# Patient Record
Sex: MALE | Race: WHITE | NOT HISPANIC OR LATINO | Employment: FULL TIME | URBAN - METROPOLITAN AREA
[De-identification: names, ages, dates, MRNs, and addresses within clinical notes are randomized per-mention and may not be internally consistent; named-entity substitution may affect disease eponyms.]

---

## 2017-01-02 ENCOUNTER — OFFICE VISIT (OUTPATIENT)
Dept: FAMILY MEDICINE | Facility: OTHER | Age: 52
End: 2017-01-02
Payer: COMMERCIAL

## 2017-01-02 VITALS
DIASTOLIC BLOOD PRESSURE: 70 MMHG | RESPIRATION RATE: 12 BRPM | SYSTOLIC BLOOD PRESSURE: 110 MMHG | WEIGHT: 192.4 LBS | HEIGHT: 70 IN | TEMPERATURE: 97.7 F | BODY MASS INDEX: 27.54 KG/M2 | HEART RATE: 76 BPM

## 2017-01-02 DIAGNOSIS — J01.01 ACUTE RECURRENT MAXILLARY SINUSITIS: Primary | ICD-10-CM

## 2017-01-02 DIAGNOSIS — R09.82 POSTNASAL DRIP: ICD-10-CM

## 2017-01-02 PROCEDURE — 99213 OFFICE O/P EST LOW 20 MIN: CPT | Performed by: PHYSICIAN ASSISTANT

## 2017-01-02 RX ORDER — CEFDINIR 300 MG/1
300 CAPSULE ORAL 2 TIMES DAILY
Qty: 20 CAPSULE | Refills: 0 | Status: SHIPPED | OUTPATIENT
Start: 2017-01-02 | End: 2017-02-10

## 2017-01-02 RX ORDER — FLUTICASONE PROPIONATE 50 MCG
1-2 SPRAY, SUSPENSION (ML) NASAL DAILY
Qty: 1 BOTTLE | Refills: 5 | Status: SHIPPED | OUTPATIENT
Start: 2017-01-02 | End: 2021-01-11

## 2017-01-02 NOTE — PROGRESS NOTES
SUBJECTIVE:                                                    Trev Soto is a 51 year old male who presents to clinic today for the following health issues:    HPI    Acute Illness   Acute illness concerns: cough  Onset: 2 months     Fever: no    Chills/Sweats: no    Headache (location?): no    Sinus Pressure:YES    Conjunctivitis:  no    Ear Pain: YES: left    Rhinorrhea: YES    Congestion: YES    Sore Throat: YES     Cough: YES-non-productive    Wheeze: no    Decreased Appetite: no    Nausea: no    Vomiting: no    Diarrhea:  no    Dysuria/Freq.: no    Fatigue/Achiness: no    Sick/Strep Exposure: YES- 2 months ago     Therapies Tried and outcome: none    He got sick during Thanksgiving with a dry cough that has not gone away. He has also been experiencing sinus pressure/congestion for the past few months as well daily nasal drainage and postnasal drip. He has taken OTC medications such as Nyquil/Dayquil which have helped him sleep. He has not tried any nasal sprays recently. He wants to make sure the post-nasal drip is not allergy related. He does have a history of seasonal allergies years ago. He also wants to be checked for food allergies as he has a family history and will experience intermittent abdominal pain and diarrhea.     Problem list and histories reviewed & adjusted, as indicated.  Additional history: none    Problem list, Medication list, Allergies, and Medical/Social/Surgical histories reviewed in [facility] and updated as appropriate.    ROS:  GENERAL: Denies fever, fatigue, weakness, weight gain, or weight loss.  HEENT: Eyes-Denies pain, redness, loss of vision, double or blurred vision.     Ears/Nose- +Nasal/sinus congestion. Denies tinnitus, loss of hearing, epistaxis, decreased sense of smell. Denies loss of sense of taste, dry mouth, or sore throat.   CARDIOVASCULAR: Denies chest pain, shortness of breath, irregular heartbeats,  palpitations, or edema.  RESPIRATORY: Denies cough,  "hemoptysis, and shortness of breath.  NEUROLOGIC: Denies headache, fainting, dizziness, memory loss, numbness, tingling, or seizures.    OBJECTIVE:                                                    /70 mmHg  Pulse 76  Temp(Src) 97.7  F (36.5  C) (Temporal)  Resp 12  Ht 5' 10\" (1.778 m)  Wt 192 lb 6.4 oz (87.272 kg)  BMI 27.61 kg/m2  Body mass index is 27.61 kg/(m^2).  GENERAL: healthy, alert and no distress  EYES: Eyes grossly normal to inspection, PERRL and conjunctivae and sclerae normal  HENT: ear canals and TM's normal. Nasal mucosa is diffusely erythematous and edematous. Pharynx is clear.   NECK: no adenopathy, no asymmetry, masses, or scars and thyroid normal to palpation  RESP: lungs clear to auscultation - no rales, rhonchi or wheezes  CV: regular rate and rhythm, normal S1 S2, no S3 or S4, no murmur, click or rub       ASSESSMENT/PLAN:                                                        ICD-10-CM    1. Acute recurrent maxillary sinusitis J01.01 ALLERGY/ASTHMA ADULT REFERRAL     cefdinir (OMNICEF) 300 MG capsule     fluticasone (FLONASE) 50 MCG/ACT spray   2. Postnasal drip R09.82 ALLERGY/ASTHMA ADULT REFERRAL     cefdinir (OMNICEF) 300 MG capsule     fluticasone (FLONASE) 50 MCG/ACT spray       Symptoms consistent with sinusitis, this almost qualifies as chronic sinusitis but has not been quite 3 months. Will prescribe Omnicef to use twice daily for 10 days. Take a daily probiotic or Activia yogurt while on this.  I recommend he start Flonase to help with the sinus inflammation/congestion.  Use 1-2 sprays in each nostril daily.  Continue with Mucinex.  Drink plenty of fluids.  Will send to an allergist for further evaluation of continued PND and sinus pressure as this may be allergy related. Will also send to evaluation of possible food allergies due to intermittent indigestion and diarrhea.     Manuel Jarquin PA-C  [facility]"

## 2017-01-02 NOTE — NURSING NOTE
"Chief Complaint   Patient presents with     Cough       Initial /70 mmHg  Pulse 76  Temp(Src) 97.7  F (36.5  C) (Temporal)  Resp 12  Ht 5' 10\" (1.778 m)  Wt 192 lb 6.4 oz (87.272 kg)  BMI 27.61 kg/m2 Estimated body mass index is 27.61 kg/(m^2) as calculated from the following:    Height as of this encounter: 5' 10\" (1.778 m).    Weight as of this encounter: 192 lb 6.4 oz (87.272 kg).  BP completed using cuff size: regular-long  Denita Yadav CMA    "

## 2017-01-02 NOTE — MR AVS SNAPSHOT
After Visit Summary   1/2/2017    Trev Soto    MRN: [id number]           Patient Information     Date Of Birth          1965        Visit Information        Provider Department      1/2/2017 12:30 PM Manuel Jarquin PA-C [facility]        Today's Diagnoses     Acute recurrent maxillary sinusitis    -  1     Postnasal drip           Care Instructions    Will prescribe an antibiotic to use twice daily for 10 days to clear up the sinus infection. Take a daily probiotic or Activia yogurt while on this.  I also recommend you start Flonase to help with the sinus inflammation.  Use 1-2 sprays in each nostril daily.  You can try Mucinex as well.  Make sure you are drinking plenty of fluids.  Will send to an allergist for further evaluation.           Follow-ups after your visit        Additional Services     ALLERGY/ASTHMA ADULT REFERRAL       Your provider has referred you to: [place]: [facility] [phone] [email or web address]    Postnasal drip and sinus congestion for 2 months, also occasional diarrhea and indigestion, wants to discuss possible food allergies.    Please be aware that coverage of these services is subject to the terms and limitations of your health insurance plan.  Call member services at your health plan with any benefit or coverage questions.      Please bring the following with you to your appointment:    (1) Any X-Rays, CTs or MRIs which have been performed.  Contact the facility where they were done to arrange for  prior to your scheduled appointment.    (2) List of current medications  (3) This referral request   (4) Any documents/labs given to you for this referral                  Who to contact     If you have questions or need follow up information about today's clinic visit or your schedule please contact [facility] directly at [phone].  Normal or non-critical lab and  "imaging results will be communicated to you by MyChart, letter or phone within 4 business days after the clinic has received the results. If you do not hear from us within 7 days, please contact the clinic through [employer] or phone. If you have a critical or abnormal lab result, we will notify you by phone as soon as possible.  Submit refill requests through [employer] or call your pharmacy and they will forward the refill request to us. Please allow 3 business days for your refill to be completed.          Additional Information About Your Visit        [employer] Information     [employer] lets you send messages to your doctor, view your test results, renew your prescriptions, schedule appointments and more. To sign up, go to [email or web address] . Click on \"Log in\" on the left side of the screen, which will take you to the Welcome page. Then click on \"Sign up Now\" on the right side of the page.     You will be asked to enter the access code listed below, as well as some personal information. Please follow the directions to create your username and password.     Your access code is: [email or web address]  Expires: 2017  1:00 PM     Your access code will  in 90 days. If you need help or a new code, please call your [place] clinic or [phone].        Your Vitals Were     Pulse Temperature Respirations Height BMI (Body Mass Index)       76 97.7  F (36.5  C) (Temporal) 12 5' 10\" (1.778 m) 27.61 kg/m2        Blood Pressure from Last 3 Encounters:   17 110/70   16 111/82   16 108/68    Weight from Last 3 Encounters:   17 192 lb 6.4 oz (87.272 kg)   16 193 lb (87.544 kg)   13 191 lb (86.637 kg)              We Performed the Following     ALLERGY/ASTHMA ADULT REFERRAL          Today's Medication Changes          These changes are accurate as of: 17  1:00 PM.  If you have any questions, ask your nurse or doctor.               Start taking these medicines.        Dose/Directions    " cefdinir 300 MG capsule   Commonly known as:  OMNICEF   Used for:  Acute recurrent maxillary sinusitis, Postnasal drip   Started by:  Manuel Jarquin PA-C        Dose:  300 mg   Take 1 capsule (300 mg) by mouth 2 times daily   Quantity:  20 capsule   Refills:  0       fluticasone 50 MCG/ACT spray   Commonly known as:  FLONASE   Used for:  Postnasal drip, Acute recurrent maxillary sinusitis   Started by:  Manuel Jarquin PA-C        Dose:  1-2 spray   Spray 1-2 sprays into both nostrils daily   Quantity:  1 Bottle   Refills:  5            Where to get your medicines      These medications were sent to [id number] - [place], [place] - [address]  [address] [facility] [address]     Phone:  [phone]    - cefdinir 300 MG capsule  - fluticasone 50 MCG/ACT spray             Primary Care Provider Office Phone # Fax #    Lukas Galo Shea [phone]       [facility]  [address] [place] [address]        Thank you!     Thank you for choosing [facility]  for your care. Our goal is always to provide you with excellent care. Hearing back from our patients is one way we can continue to improve our services. Please take a few minutes to complete the written survey that you may receive in the mail after your visit with us. Thank you!             Your Updated Medication List - Protect others around you: Learn how to safely use, store and throw away your medicines at www.disposemymeds.org.          This list is accurate as of: 1/2/17  1:00 PM.  Always use your most recent med list.                   Brand Name Dispense Instructions for use    cefdinir 300 MG capsule    OMNICEF    20 capsule    Take 1 capsule (300 mg) by mouth 2 times daily       fluticasone 50 MCG/ACT spray    FLONASE    1 Bottle    Spray 1-2 sprays into both nostrils daily

## 2017-01-02 NOTE — PATIENT INSTRUCTIONS
Will prescribe an antibiotic to use twice daily for 10 days to clear up the sinus infection. Take a daily probiotic or Activia yogurt while on this.  I also recommend you start Flonase to help with the sinus inflammation.  Use 1-2 sprays in each nostril daily.  You can try Mucinex as well.  Make sure you are drinking plenty of fluids.  Will send to an allergist for further evaluation.

## 2017-01-17 ENCOUNTER — OFFICE VISIT (OUTPATIENT)
Dept: ALLERGY | Facility: OTHER | Age: 52
End: 2017-01-17
Payer: COMMERCIAL

## 2017-01-17 VITALS
DIASTOLIC BLOOD PRESSURE: 72 MMHG | OXYGEN SATURATION: 98 % | SYSTOLIC BLOOD PRESSURE: 110 MMHG | HEART RATE: 59 BPM | BODY MASS INDEX: 27.35 KG/M2 | HEIGHT: 70 IN | WEIGHT: 191 LBS

## 2017-01-17 DIAGNOSIS — J30.1 SEASONAL ALLERGIC RHINITIS DUE TO POLLEN: ICD-10-CM

## 2017-01-17 DIAGNOSIS — E73.9 LACTOSE INTOLERANCE IN ADULT: Primary | ICD-10-CM

## 2017-01-17 DIAGNOSIS — J30.81 ALLERGIC RHINITIS DUE TO DOGS: ICD-10-CM

## 2017-01-17 DIAGNOSIS — J30.89 ALLERGIC RHINITIS DUE TO DUST MITE: ICD-10-CM

## 2017-01-17 PROBLEM — J31.0 CHRONIC RHINITIS: Status: ACTIVE | Noted: 2017-01-17

## 2017-01-17 PROCEDURE — 95004 PERQ TESTS W/ALRGNC XTRCS: CPT | Performed by: ALLERGY & IMMUNOLOGY

## 2017-01-17 PROCEDURE — 99243 OFF/OP CNSLTJ NEW/EST LOW 30: CPT | Mod: 25 | Performed by: ALLERGY & IMMUNOLOGY

## 2017-01-17 NOTE — ASSESSMENT & PLAN NOTE
Symptoms of bloating, increased gas and diarrhea after consuming lactose-containing foods. Tolerates cheese, but has problem with ice cream.    - Symptoms suggestive of lactose intolerance as opposed to IgE mediated milk allergy. No other IgE symptoms. Would suggest trial of Lactaid with lactose-containing foods or lactose-free foods or avoidance of milk products.

## 2017-01-17 NOTE — NURSING NOTE
Per provider verbal order, RN placed Adult Environmental scratch testing panels.  Consent was obtained prior to procedure.  Once panels were placed, patient was monitored for 15 minutes in clinic.  RN read test after 15 minutes and provider was notified of results.  Pt tolerated procedure well.  All questions and concerns were addressed at office visit.     Angela Acevedo RN

## 2017-01-17 NOTE — PROGRESS NOTES
Trev Soto is a 51 year old White male with previous medical history significant for chronic post nasal drip. Trev Soto is being seen today for evaluation of seasonal allergies. The patient is being seen in consultation at the request of Alexandru Jarquin PA-C.    Patient reports that they have had allergy symptoms since his 20's. Symptoms were originally just in the spring. He underwent skin testing in his 20's and positive for trees and dust mite. Now over the last few years he has had perennial without seasonal worsening post nasal dripping, congestion, clearing of throat, sinus pressure, and ocular itching. He also complains of hoarse voice. He has had increased sinus pressure since thanksgiving and was recently treated with cefdinir. Symptoms have improved significantly, but chronic post nasal dripping remains. He was prescribed Flonase, but he never used. He has tried Claritin and Sudafed in the past and both were helpful, but not completely. No CT of sinuses. No ENT evaluation. No problems around cats or dogs. He is interested in allergy testing today.  The patient has no history of asthma, eczema, food allergies, medications allergies or hives.     Patient reports that when he consumes milk he will develop abdominal bloating, increased gas and diarrhea. Denies vomiting. Denies hives, angioedema, shortness of breath. Symptoms occur right away and last hours. He can tolerate cheese. If he consumes ice cream without significant symptoms. He has never tried Lactaid. He has never tried lactose-free products.    ENVIRONMENTAL HISTORY: The family lives in a [facility] [place] The home is heated with a forced air. They does have central air conditioning. The patient's bedroom is furnished with carpeting in bedroom.  Pets inside the house include 1 dog(s). There is not history of cockroach or mice infestation. There is/are 0 smokers in the house.  The house does not have a damp basement.      History reviewed. No pertinent past medical history.  Family History   Problem Relation Age of Onset     DIABETES Mother      DIABETES Maternal Grandfather      Prostate Cancer Father 70     CEREBROVASCULAR DISEASE Father      Past Surgical History   Procedure Laterality Date     C appendectomy  2002     C anesth,knee area surgery  2001     Tobin Procedure, left knee       REVIEW OF SYSTEMS:  General: positive  for weight gain. negative for weight loss. negative for changes in sleep.   Ears: negative for fullness. negative for hearing loss. negative for dizziness.   Nose: positive  for snoring negative  for changes in smell. positive  for drainage.   Eyes: negative for eye watering. positive  for eye itching. negative for vision changes. negative for eye redness.  Throat: positive  for hoarseness. positive  for sore throat. negative for trouble swallowing.   Lungs: negative for shortness of breath.negative for wheezing. positive  for sputum production.   Cardiovascular: negative for chest pain. negative for swelling of ankles. negative for fast or irregular heartbeat.   Gastrointestinal: negative for nausea. positive  for heartburn. positive  for acid reflux.   Musculoskeletal: positive  for joint pain. positive  for joint stiffness. negative for joint swelling.   Neurologic: negative for seizures. negative for fainting. negative for weakness.   Psychiatric: negative for changes in mood. negative for anxiety.   Endocrine: negative for cold intolerance. negative for heat intolerance. negative for tremors.   Lymphatic: negative for lower extremity swelling. negative for lymph node swelling.   Hematologic: negative for easy bruising. negative for easy bleeding.  Integumentary: negative for rash. negative for scaling. negative for nail changes.       Current outpatient prescriptions:      cefdinir (OMNICEF) 300 MG capsule, Take 1 capsule (300 mg) by mouth 2 times daily, Disp: 20 capsule, Rfl: 0     fluticasone  (FLONASE) 50 MCG/ACT spray, Spray 1-2 sprays into both nostrils daily, Disp: 1 Bottle, Rfl: 5  Immunization History   Administered Date(s) Administered     TDAP (BOOSTRIX AGES 10-64) 02/05/2016     No Known Allergies      EXAM:   Constitutional:  Appears well-developed and well-nourished. No distress.   HEENT:   Head: Normocephalic.   Right Ear: External ear normal. TM normal  Left Ear: External ear normal. TM normal  Mouth/Throat: No oropharyngeal exudate present.   Cobblestoning of posterior oropharynx.   Boggy nasal tissue and pale.    Eyes: Conjunctivae are non-erythematous   No maxillary or frontal sinus tenderness to palpation.   Cardiovascular: Normal rate, regular rhythm and normal heart sounds. Exam reveals no gallop and no friction rub.   No murmur heard.  Respiratory: Effort normal and breath sounds normal. No respiratory distress. No wheezes. No rales.   Musculoskeletal: Normal range of motion.   Lymphadenopathy:   No cervical adenopathy.   No lower extremity edema.   Neuro: Oriented to person, place, and time.  Skin: Skin is warm and dry. No rash noted.   Psychiatric: Normal mood and affect.     Nursing note and vitals reviewed.      WORKUP:   Skin testing  Positive for dog, dust mite, trees, grasses and weeds.  ASSESSMENT/PLAN:  Problem List Items Addressed This Visit        Respiratory    Seasonal allergic rhinitis due to pollen     Perennial postnasal drip, congestion, sinus pressure, nasal itching. Associated with throat clearing and hoarseness of voice. Allergy testing and his 20s positive for trees and dust mite. Has tried loratadine and pseudoephedrine both of which have been helpful. No use of nasal corticosteroid. No CT scan of the sinuses. No ENT evaluation.    Allergy testing:  Positive for dog, dust mites, trees, weeds and grasses.    - Flonase, Nasacort or Rhinocort 2 sprays/nostril daily.   - Environmental avoidance measure were provided and discussed with the patient.   - Zyrtec or  Allegra daily as needed.   - Ketotifen (Zaditor, Alaway) 1 drop/eye twice daily as needed.   - Return to clinic in 3 months.   - He would be immunotherapy candidate.              Allergic rhinitis due to dust mite     .         Allergic rhinitis due to dogs     .            Digestive    Lactose intolerance in adult - Primary     Symptoms of bloating, increased gas and diarrhea after consuming lactose-containing foods. Tolerates cheese, but has problem with ice cream.    - Symptoms suggestive of lactose intolerance as opposed to IgE mediated milk allergy. No other IgE symptoms. Would suggest trial of Lactaid with lactose-containing foods or lactose-free foods or avoidance of milk products.               Chart documentation with Dragon Voice recognition Software. Although reviewed after completion, some words and grammatical errors may remain.    Justin Bone,    Allergy/Immunology  [facility], [place] and [place], [place]

## 2017-01-17 NOTE — NURSING NOTE
"Chief Complaint   Patient presents with     Consult     post nasal drip       Initial /72 mmHg  Pulse 59  Ht 5' 10.24\" (1.784 m)  Wt 191 lb (86.637 kg)  BMI 27.22 kg/m2  SpO2 98% Estimated body mass index is 27.22 kg/(m^2) as calculated from the following:    Height as of this encounter: 5' 10.24\" (1.784 m).    Weight as of this encounter: 191 lb (86.637 kg).  BP completed using cuff size: virginia Osborne MA      "

## 2017-01-17 NOTE — PATIENT INSTRUCTIONS
If you have any questions regarding your allergies, asthma, or what we discussed during your visit today please call the allergy clinic or contact us via [employer].    [facility] ([place], [place]): [phone]  [facility]: [phone]  [facility]: [phone]  [facility]: [phone]  [facility]: [phone]  [facility]: [phone]      - Flonase, Nasacort or Rhinocort 2 sprays/nostril daily.   - Zyrtec or Allegra daily as needed.   - Ketotifen (Zaditor, Alaway) 1 drop/eye twice daily as needed.   - Symptoms suggestive of lactose intolerance as opposed to milk allergy. Would suggest trial of Lactaid with lactose-containing foods or lactose-free foods or avoidance of milk products.    Testing positive for dog, dust mites, trees, grasses and weeds.     AEROALLERGEN AVOIDANCE INSTRUCTIONS  POLLEN  Pollens are the tiny airborne particles given off by trees, weeds, and grasses. They can be the cause of seasonal allergic rhinitis or hay fever symptoms, which include stuffy, itchy, runny nose, redness, swelling and itching of the eyes, and itching of the ears and throat. Here are some tips on how to avoid pollen exposure.  1. .Keep windows closed and use the air conditioner when possible.  2.  Avoid outside exposure in the early morning as pollen counts are highest at that time.  3.  Take a shower and wash hair each night.  4.  Consider wearing a mask when working in the yard and/or garden.  5.  Clean furnace filter monthly with HEPA filters. Consider a HEPA filter vacuum  which will prevent pollen from being reintroduced into the air.   DUST MITES  Dust mites can never be entirely eliminated in the house no matter how clean your house is. Dust mites are attracted to warm, moist areas and feed on dead skin flakes. Here are tips to minimize dust mites in your home.  1.  Encase pillows and mattress/box springs in zippered allergy  covers.  2.  Wash bedding in hot water (at least 130 F) every 7-14 days.  3.  Avoid curtains, carpet, and upholstered furniture if possible.  4.  Use HEPA air filters and a HEPA filter vacuum . Change filters monthly. Vacuum weekly.  5.  Keep bedroom simple, avoiding clutter, so it can quickly be dusted.  6.  Cover heating vents with vent filters.  7.  Keep stuffed toys in a closed container and wash or freeze regularly.  8.  Keep clothing in the closet with the door closed.  PETS  Pets present many problems for people with allergies. Dander from pets is very difficult to remove and also is a food source for dust mites.  1.  If possible, find the pet a new home.  2.  If not possible, keep the pet outdoors. Never allow the pet into the bedroom.  3.  Wash pet weekly in warm water.  4.  Encase mattresses, pillows, and box springs in allergen-proof covers.  5.  Use HEPA air filters and a HEPA filter vacuum . Change filters monthly.

## 2017-01-17 NOTE — ASSESSMENT & PLAN NOTE
Perennial postnasal drip, congestion, sinus pressure, nasal itching. Associated with throat clearing and hoarseness of voice. Allergy testing and his 20s positive for trees and dust mite. Has tried loratadine and pseudoephedrine both of which have been helpful. No use of nasal corticosteroid. No CT scan of the sinuses. No ENT evaluation.    Allergy testing:  Positive for dog, dust mites, trees, weeds and grasses.    - Flonase, Nasacort or Rhinocort 2 sprays/nostril daily.   - Environmental avoidance measure were provided and discussed with the patient.   - Zyrtec or Allegra daily as needed.   - Ketotifen (Zaditor, Alaway) 1 drop/eye twice daily as needed.   - Return to clinic in 3 months.   - He would be immunotherapy candidate.

## 2017-01-17 NOTE — MR AVS SNAPSHOT
After Visit Summary   1/17/2017    Trev Soto    MRN: [id number]           Patient Information     Date Of Birth          1965        Visit Information        Provider Department      1/17/2017 8:00 AM Justin Bone,  [facility]        Today's Diagnoses     Lactose intolerance in adult    -  1     Chronic rhinitis           Care Instructions    If you have any questions regarding your allergies, asthma, or what we discussed during your visit today please call the allergy clinic or contact us via [employer].    [facility] Allergy ([place], [place]): [phone]  [facility] Allergy: [phone]  [facility]: [phone]  [facility] Allergy: [phone]  [facility] Allergy: [phone]  [facility] Allergy: [phone]      - Flonase, Nasacort or Rhinocort 2 sprays/nostril daily.   - Zyrtec or Allegra daily as needed.   - Ketotifen (Zaditor, Alaway) 1 drop/eye twice daily as needed.   - Symptoms suggestive of lactose intolerance as opposed to milk allergy. Would suggest trial of Lactaid with lactose-containing foods or lactose-free foods or avoidance of milk products.    Testing positive for dog, dust mites, trees, grasses and weeds.     AEROALLERGEN AVOIDANCE INSTRUCTIONS  POLLEN  Pollens are the tiny airborne particles given off by trees, weeds, and grasses. They can be the cause of seasonal allergic rhinitis or hay fever symptoms, which include stuffy, itchy, runny nose, redness, swelling and itching of the eyes, and itching of the ears and throat. Here are some tips on how to avoid pollen exposure.  1. .Keep windows closed and use the air conditioner when possible.  2.  Avoid outside exposure in the early morning as pollen counts are highest at that time.  3.  Take a shower and wash hair each night.  4.  Consider wearing a mask when working in the yard and/or garden.  5.  Clean furnace filter monthly with HEPA filters.  Consider a HEPA filter vacuum  which will prevent pollen from being reintroduced into the air.   DUST MITES  Dust mites can never be entirely eliminated in the house no matter how clean your house is. Dust mites are attracted to warm, moist areas and feed on dead skin flakes. Here are tips to minimize dust mites in your home.  1.  Encase pillows and mattress/box springs in zippered allergy covers.  2.  Wash bedding in hot water (at least 130 F) every 7-14 days.  3.  Avoid curtains, carpet, and upholstered furniture if possible.  4.  Use HEPA air filters and a HEPA filter vacuum . Change filters monthly. Vacuum weekly.  5.  Keep bedroom simple, avoiding clutter, so it can quickly be dusted.  6.  Cover heating vents with vent filters.  7.  Keep stuffed toys in a closed container and wash or freeze regularly.  8.  Keep clothing in the closet with the door closed.  PETS  Pets present many problems for people with allergies. Dander from pets is very difficult to remove and also is a food source for dust mites.  1.  If possible, find the pet a new home.  2.  If not possible, keep the pet outdoors. Never allow the pet into the bedroom.  3.  Wash pet weekly in warm water.  4.  Encase mattresses, pillows, and box springs in allergen-proof covers.  5.  Use HEPA air filters and a HEPA filter vacuum . Change filters monthly.            Follow-ups after your visit        Follow-up notes from your care team     Return in about 3 months (around 4/17/2017).      Who to contact     If you have questions or need follow up information about today's clinic visit or your schedule please contact [facility] directly at [phone].  Normal or non-critical lab and imaging results will be communicated to you by MyChart, letter or phone within 4 business days after the clinic has received the results. If you do not hear from us within 7 days, please contact the clinic through MyChart or phone. If you  "have a critical or abnormal lab result, we will notify you by phone as soon as possible.  Submit refill requests through [employer] or call your pharmacy and they will forward the refill request to us. Please allow 3 business days for your refill to be completed.          Additional Information About Your Visit        [employer] Information     [employer] lets you send messages to your doctor, view your test results, renew your prescriptions, schedule appointments and more. To sign up, go to [email or web address] . Click on \"Log in\" on the left side of the screen, which will take you to the Welcome page. Then click on \"Sign up Now\" on the right side of the page.     You will be asked to enter the access code listed below, as well as some personal information. Please follow the directions to create your username and password.     Your access code is: [email or web address]  Expires: 2017  1:00 PM     Your access code will  in 90 days. If you need help or a new code, please call your [place] clinic or [phone].        Care EveryWhere ID     This is your Care EveryWhere ID. This could be used by other organizations to access your [place] medical records  [id number]        Your Vitals Were     Pulse Height BMI (Body Mass Index) Pulse Oximetry          59 5' 10.24\" (1.784 m) 27.22 kg/m2 98%         Blood Pressure from Last 3 Encounters:   17 110/72   17 110/70   16 111/82    Weight from Last 3 Encounters:   17 191 lb (86.637 kg)   17 192 lb 6.4 oz (87.272 kg)   16 193 lb (87.544 kg)              Today, you had the following     No orders found for display       Primary Care Provider Office Phone # Fax #    Lukas Galo Shea [phone]       [facility]  [address] [place] [address]        Thank you!     Thank you for choosing [facility]  for your care. Our goal is always to provide you with excellent care. " Hearing back from our patients is one way we can continue to improve our services. Please take a few minutes to complete the written survey that you may receive in the mail after your visit with us. Thank you!             Your Updated Medication List - Protect others around you: Learn how to safely use, store and throw away your medicines at www.disposemymeds.org.          This list is accurate as of: 1/17/17  9:22 AM.  Always use your most recent med list.                   Brand Name Dispense Instructions for use    cefdinir 300 MG capsule    OMNICEF    20 capsule    Take 1 capsule (300 mg) by mouth 2 times daily       fluticasone 50 MCG/ACT spray    FLONASE    1 Bottle    Spray 1-2 sprays into both nostrils daily

## 2017-01-17 NOTE — Clinical Note
I saw Trev today as a new patient. Skin testing was positive to dog, dm, grass, weeds and trees. Discussed allergy treatment including nasal sprays, oral antihistamine as needed and eye antihistamine as needed. He will return to clinic in 3 months. Possibly will start allergy shots. Please see note for details. Thanks.   Justin Bone

## 2017-02-09 NOTE — PROGRESS NOTES
SUBJECTIVE:                                                    Trev Soto is a 52 year old male who presents to clinic today for the following health issues:    HPI    Acute Illness   Acute illness concerns: flu   Onset: 1.5 weeks     Fever: YES    Chills/Sweats: YES- chills    Headache (location?): YES- behind eyes    Sinus Pressure:YES- post-nasal drainage and facial pain    Conjunctivitis:  no    Ear Pain: YES: both    Rhinorrhea: no    Congestion: YES    Sore Throat: YES-due to coughing     Cough: YES-productive of green sputum    Wheeze: YES    Decreased Appetite: YES    Nausea: YES    Vomiting: no    Diarrhea:  YES    Dysuria/Freq.: no    Fatigue/Achiness: YES    Sick/Strep Exposure: no     Therapies Tried and outcome: otc medications, Flonase      Was treated 1 month ago for similar symptoms that resolved for 2 weeks then returned. He is having a lot of sinus pressure, postnasal drip, and a consistently productive cough. He has felt short of breath with some wheezing. Has had some chills. Flonase is helping with congestion.     Problem list and histories reviewed & adjusted, as indicated.  Additional history: none    Problem list, Medication list, Allergies, and Medical/Social/Surgical histories reviewed in [facility] and updated as appropriate.    ROS:  GENERAL: +Fatigue. Denies weakness, weight gain, or weight loss.  HEENT: Eyes-Denies pain, redness, loss of vision, double or blurred vision.     Ears/Nose- +Sinus congestion/pressure, postnasal drip. Denies tinnitus, loss of hearing, epistaxis, decreased sense of smell. Denies loss of sense of taste, dry mouth, or sore throat.   CARDIOVASCULAR: Denies chest pain, shortness of breath, irregular heartbeats,  palpitations, or edema.  RESPIRATORY: +cough, wheezing, shortness of breath. Denies hemoptysis.    OBJECTIVE:                                                    /70 mmHg  Pulse 68  Temp(Src) 98.4  F (36.9  C) (Temporal)  Resp 12  Ht 5'  "10.25\" (1.784 m)  Wt 194 lb (87.998 kg)  BMI 27.65 kg/m2  SpO2 96%  Body mass index is 27.65 kg/(m^2).  GENERAL: healthy, alert and no distress  EYES: Eyes grossly normal to inspection, PERRL and conjunctivae and sclerae normal  HENT: ear canals and TM's normal, nasal mucosa is erythematous and edematous  NECK: no adenopathy, no asymmetry, masses, or scars and thyroid normal to palpation  RESP: lungs with expiratory rhonchi over right upper and lower lobes, no wheezing or crackles  CV: regular rate and rhythm, normal S1 S2, no S3 or S4, no murmur, click or rub    Diagnostic Test Results:  Influenza A/B: negative     ASSESSMENT/PLAN:                                                        ICD-10-CM    1. Acute bronchitis with symptoms > 10 days J20.9 Influenza A/B antigen     azithromycin (ZITHROMAX) 250 MG tablet     predniSONE (DELTASONE) 20 MG tablet     benzonatate (TESSALON) 100 MG capsule   2. Acute sinusitis with symptoms > 10 days J01.90        Symptoms are consistent with bronchitis and sinusitis  Will prescribe azithromycin to take as directed to cover for bacterial bronchitis and sinusitis.   Will also prescribe prednisone to take once daily for 5 days to help with the sinus congestion and wheezing.  Drink plenty of fluids.  Tessalon pearls to help with cough.   Continue with the sinus rinse to help with nasal congestion. Mucinex can also be helpful.   Continue Flonase to help with nasal swelling.  Follow up if symptoms are not improving    Manuel Jarquin PA-C  [facility]  "

## 2017-02-10 ENCOUNTER — OFFICE VISIT (OUTPATIENT)
Dept: FAMILY MEDICINE | Facility: OTHER | Age: 52
End: 2017-02-10
Payer: COMMERCIAL

## 2017-02-10 VITALS
SYSTOLIC BLOOD PRESSURE: 118 MMHG | OXYGEN SATURATION: 96 % | RESPIRATION RATE: 12 BRPM | BODY MASS INDEX: 27.77 KG/M2 | HEIGHT: 70 IN | WEIGHT: 194 LBS | TEMPERATURE: 98.4 F | HEART RATE: 68 BPM | DIASTOLIC BLOOD PRESSURE: 70 MMHG

## 2017-02-10 DIAGNOSIS — J20.9 ACUTE BRONCHITIS WITH SYMPTOMS > 10 DAYS: Primary | ICD-10-CM

## 2017-02-10 DIAGNOSIS — J01.90 ACUTE SINUSITIS WITH SYMPTOMS > 10 DAYS: ICD-10-CM

## 2017-02-10 LAB
FLUAV+FLUBV AG SPEC QL: NEGATIVE
FLUAV+FLUBV AG SPEC QL: NEGATIVE
SPECIMEN SOURCE: NORMAL

## 2017-02-10 PROCEDURE — 99213 OFFICE O/P EST LOW 20 MIN: CPT | Performed by: PHYSICIAN ASSISTANT

## 2017-02-10 PROCEDURE — 87804 INFLUENZA ASSAY W/OPTIC: CPT | Performed by: PHYSICIAN ASSISTANT

## 2017-02-10 RX ORDER — PREDNISONE 20 MG/1
20 TABLET ORAL DAILY
Qty: 5 TABLET | Refills: 0 | Status: SHIPPED | OUTPATIENT
Start: 2017-02-10 | End: 2021-01-11

## 2017-02-10 RX ORDER — AZITHROMYCIN 250 MG/1
TABLET, FILM COATED ORAL
Qty: 6 TABLET | Refills: 0 | Status: SHIPPED | OUTPATIENT
Start: 2017-02-10 | End: 2021-01-11

## 2017-02-10 RX ORDER — BENZONATATE 100 MG/1
100 CAPSULE ORAL 3 TIMES DAILY PRN
Qty: 42 CAPSULE | Refills: 0 | Status: SHIPPED | OUTPATIENT
Start: 2017-02-10 | End: 2021-01-11

## 2017-02-10 NOTE — MR AVS SNAPSHOT
"              After Visit Summary   2/10/2017    Trev Soto    MRN: [id number]           Patient Information     Date Of Birth          1965        Visit Information        Provider Department      2/10/2017 10:15 AM Manuel Jarquin PA-C [facility]        Today's Diagnoses     Acute bronchitis with symptoms > 10 days    -  1     Acute sinusitis with symptoms > 10 days           Care Instructions    Symptoms are consistent with bronchitis and another sinus infection.  Will prescribe an antibiotic called azithromycin to take for 5 days.   Will also prescribe a steroid to take once daily for 5 days to help with the sinus congestion and wheezing.  Drink plenty of fluids.  Continue with the sinus rinse to help with nasal congestion. Mucinex can also be helpful.   Continue Flonase to help with nasal swelling.  Follow up if symptoms are not improving          Follow-ups after your visit        Who to contact     If you have questions or need follow up information about today's clinic visit or your schedule please contact [facility] directly at [phone].  Normal or non-critical lab and imaging results will be communicated to you by [employer], letter or phone within 4 business days after the clinic has received the results. If you do not hear from us within 7 days, please contact the clinic through [employer] or phone. If you have a critical or abnormal lab result, we will notify you by phone as soon as possible.  Submit refill requests through [employer] or call your pharmacy and they will forward the refill request to us. Please allow 3 business days for your refill to be completed.          Additional Information About Your Visit        TapC[employer]dhart Information     [employer] lets you send messages to your doctor, view your test results, renew your prescriptions, schedule appointments and more. To sign up, go to [email or web address] . Click on \"Log in\" on the left side of the " "screen, which will take you to the Welcome page. Then click on \"Sign up Now\" on the right side of the page.     You will be asked to enter the access code listed below, as well as some personal information. Please follow the directions to create your username and password.     Your access code is: [email or web address]  Expires: 2017  1:00 PM     Your access code will  in 90 days. If you need help or a new code, please call your [facility] or [phone].        Care EveryWhere ID     This is your Care EveryWhere ID. This could be used by other organizations to access your [place] medical records  [id number]        Your Vitals Were     Pulse Temperature Respirations Height BMI (Body Mass Index) Pulse Oximetry    68 98.4  F (36.9  C) (Temporal) 12 5' 10.25\" (1.784 m) 27.65 kg/m2 96%       Blood Pressure from Last 3 Encounters:   02/10/17 118/70   17 110/72   17 110/70    Weight from Last 3 Encounters:   02/10/17 194 lb (87.998 kg)   17 191 lb (86.637 kg)   17 192 lb 6.4 oz (87.272 kg)              We Performed the Following     Influenza A/B antigen          Today's Medication Changes          These changes are accurate as of: 2/10/17 10:37 AM.  If you have any questions, ask your nurse or doctor.               Start taking these medicines.        Dose/Directions    azithromycin 250 MG tablet   Commonly known as:  ZITHROMAX   Used for:  Acute bronchitis with symptoms > 10 days   Started by:  Manuel Jarquin PA-C        Two tablets first day, then one tablet daily for four days.   Quantity:  6 tablet   Refills:  0       predniSONE 20 MG tablet   Commonly known as:  DELTASONE   Used for:  Acute bronchitis with symptoms > 10 days   Started by:  Manuel Jarquin PA-C        Dose:  20 mg   Take 1 tablet (20 mg) by mouth daily   Quantity:  5 tablet   Refills:  0            Where to get your medicines      These medications were sent to [facility] [address] [place] [address] - " [address] [facility] [address]  [address], [facility] [address]     Phone:  [phone]    - azithromycin 250 MG tablet  - predniSONE 20 MG tablet             Primary Care Provider Office Phone # Fax #    Manuel Jarquin PA-C [phone]       [address]  [facility] [address]        Thank you!     Thank you for choosing [facility]  for your care. Our goal is always to provide you with excellent care. Hearing back from our patients is one way we can continue to improve our services. Please take a few minutes to complete the written survey that you may receive in the mail after your visit with us. Thank you!             Your Updated Medication List - Protect others around you: Learn how to safely use, store and throw away your medicines at www.disposemymeds.org.          This list is accurate as of: 2/10/17 10:37 AM.  Always use your most recent med list.                   Brand Name Dispense Instructions for use    azithromycin 250 MG tablet    ZITHROMAX    6 tablet    Two tablets first day, then one tablet daily for four days.       fluticasone 50 MCG/ACT spray    FLONASE    1 Bottle    Spray 1-2 sprays into both nostrils daily       predniSONE 20 MG tablet    DELTASONE    5 tablet    Take 1 tablet (20 mg) by mouth daily

## 2017-02-10 NOTE — NURSING NOTE
"Chief Complaint   Patient presents with     Flu     Panel Management     flu shot, height, mychart       Initial /70 mmHg  Pulse 68  Temp(Src) 98.4  F (36.9  C) (Temporal)  Resp 12  Ht 5' 10.25\" (1.784 m)  Wt 194 lb (87.998 kg)  BMI 27.65 kg/m2  SpO2 96% Estimated body mass index is 27.65 kg/(m^2) as calculated from the following:    Height as of this encounter: 5' 10.25\" (1.784 m).    Weight as of this encounter: 194 lb (87.998 kg).  Medication Reconciliation: complete     Dianne Thorpe CMA      "

## 2017-02-10 NOTE — PATIENT INSTRUCTIONS
Symptoms are consistent with bronchitis and another sinus infection.  Will prescribe an antibiotic called azithromycin to take for 5 days.   Will also prescribe a steroid to take once daily for 5 days to help with the sinus congestion and wheezing.  Drink plenty of fluids.  Continue with the sinus rinse to help with nasal congestion. Mucinex can also be helpful.   Continue Flonase to help with nasal swelling.  Follow up if symptoms are not improving

## 2018-01-22 ENCOUNTER — TELEPHONE (OUTPATIENT)
Dept: FAMILY MEDICINE | Facility: OTHER | Age: 53
End: 2018-01-22

## 2018-01-22 NOTE — TELEPHONE ENCOUNTER
Reason for call:  Patient reporting a symptom    Symptom or request: persistant cough, upper resp/allergy thing going on    Duration (how long have symptoms been present): a month and a half    Have you been treated for this before? Yes    Additional comments: pt states JM has given him a med for this in the past and would like to talk to him to see if he could again    Phone Number patient can be reached at:  Cell number on file:    Telephone Information:   Mobile [phone]       Best Time:  any    Can we leave a detailed message on this number:  YES    Call taken on 1/22/2018 at 9:44 AM by Mihaela Weller

## 2018-01-22 NOTE — TELEPHONE ENCOUNTER
"Trev Soto is a 52 year old male who calls with persistent cough.    NURSING ASSESSMENT:  Description: Patient is having a dry persistent cough. Will \"pop up\" out of nowhere.  Onset/duration:  6 weeks  Precip. factors:  History of allergies and post nasal drip.   Associated symptoms: None - had a cold, but is resolved.   Improves/worsens symptoms:  Mucinex and \"other OTC medication\"  Pain scale (0-10)   0/10  Last exam/Treatment:  2/2017  Allergies: No Known Allergies    RECOMMENDED DISPOSITION:  See in 72 hours - patient is questioning \"cold induced asthma\" and would like to consider an inhaler. Recommend visit to discuss.  Will comply with recommendation: no - would like to try steam and more dedicated measures at home.   If further questions/concerns or if Sx do not improve, worsen or new Sx develop, call your PCP or [facility] as soon as possible.    NOTES:  Disposition was determined by the first positive assessment question, therefore all previous assessment questions were negative.     Guideline used:  Telephone Triage Protocols for Nurses, Fifth Edition, Altagracia Johnson, RN, BSN        "

## 2020-12-17 ENCOUNTER — TRANSFERRED RECORDS (OUTPATIENT)
Dept: HEALTH INFORMATION MANAGEMENT | Facility: CLINIC | Age: 55
End: 2020-12-17

## 2021-01-07 NOTE — PROGRESS NOTES
[address]  [facility] [address]  Phone: [phone]  Primary Provider: Kenan Jarquin  Pre-op Performing Provider: KENAN JARQUIN    PREOPERATIVE EVALUATION:  Today's date: 1/11/2021    Trev Soto is a 55 year old male who presents for a preoperative evaluation.    Surgical Information:  Surgery/Procedure: right biceps tenodesis with possible labral repair  Surgery Location: [facility]  Surgeon: Dr. Gross  Surgery Date: 1/26/2021  Time of Surgery: tbd  Where patient plans to recover: At home with family  Fax number for surgical facility: [phone]    Type of Anesthesia Anticipated: General    Subjective     HPI related to upcoming procedure: He has been dealing with right shoulder pain and worsening weakness for the past few years and was found to have a labral tear/SLAP tear. He will be undergoing a biceps tenodesis with possible labral repair with Dr. Gross on 1/26/2021.       Preop Questions 1/11/2021   1. Have you ever had a heart attack or stroke? No   2. Have you ever had surgery on your heart or blood vessels, such as a stent placement, a coronary artery bypass, or surgery on an artery in your head, neck, heart, or legs? No   3. Do you have chest pain with activity? No   4. Do you have a history of  heart failure? No   5. Do you currently have a cold, bronchitis or symptoms of other infection? No   6. Do you have a cough, shortness of breath, or wheezing? No   7. Do you or anyone in your family have previous history of blood clots? No   8. Do you or does anyone in your family have a serious bleeding problem such as prolonged bleeding following surgeries or cuts? No   9. Have you ever had problems with anemia or been told to take iron pills? No   10. Have you had any abnormal blood loss such as black, tarry or bloody stools? No   11. Have you ever had a blood transfusion? No   12. Are you willing to have a blood  transfusion if it is medically needed before, during, or after your surgery? Yes   13. Have you or any of your relatives ever had problems with anesthesia? No   14. Do you have sleep apnea, excessive snoring or daytime drowsiness? No   15. Do you have any artifical heart valves or other implanted medical devices like a pacemaker, defibrillator, or continuous glucose monitor? No   16. Do you have artificial joints? No   17. Are you allergic to latex? No     Health Care Directive:  Patient does not have a Health Care Directive or Living Will: Patient states has Advance Directive and will bring in a copy to clinic.    Status of Chronic Conditions:  See problem list for active medical problems.  Problems all longstanding and stable, except as noted/documented.  See ROS for pertinent symptoms related to these conditions.      Review of Systems  CONSTITUTIONAL: NEGATIVE for fever, chills, change in weight  INTEGUMENTARY/SKIN: NEGATIVE for worrisome rashes, moles or lesions  EYES: NEGATIVE for vision changes or irritation  ENT/MOUTH: NEGATIVE for ear, mouth and throat problems  RESP: NEGATIVE for significant cough or SOB  CV: NEGATIVE for chest pain, palpitations or peripheral edema  GI: NEGATIVE for nausea, abdominal pain, heartburn, or change in bowel habits  : NEGATIVE for frequency, dysuria, or hematuria  MUSCULOSKELETAL: +Right shoulder pain.   NEURO: +Right shoulder/arm weakness. NEGATIVE dizziness or paresthesias  ENDOCRINE: NEGATIVE for temperature intolerance, skin/hair changes  HEME: NEGATIVE for bleeding problems  PSYCHIATRIC: NEGATIVE for changes in mood or affect    Patient Active Problem List    Diagnosis Date Noted     Lactose intolerance in adult 01/17/2017     Priority: Medium     Seasonal allergic rhinitis due to pollen 01/17/2017     Priority: Medium     Allergic rhinitis due to dust mite 01/17/2017     Priority: Medium     Allergic rhinitis due to dogs 01/17/2017     Priority: Medium      "CARDIOVASCULAR SCREENING; LDL GOAL LESS THAN 160 10/31/2010     Priority: Medium      History reviewed. No pertinent past medical history.  Past Surgical History:   Procedure Laterality Date     C ANESTH,KNEE AREA SURGERY  2001    Tobin Procedure, left knee     C APPENDECTOMY  2002     No current outpatient medications on file.       No Known Allergies     Social History     Tobacco Use     Smoking status: Never Smoker     Smokeless tobacco: Never Used   Substance Use Topics     Alcohol use: No     History   Drug Use No         Objective     /80   Pulse 68   Temp 97.2  F (36.2  C) (Temporal)   Resp 16   Ht 1.805 m (5' 11.06\")   Wt 85.2 kg (187 lb 12.8 oz)   SpO2 99%   BMI 26.15 kg/m      Physical Exam    GENERAL APPEARANCE: healthy, alert and no distress     EYES: EOMI,  PERRL     HENT: ear canals and TM's normal and nose and mouth without ulcers or lesions     NECK: no adenopathy, no asymmetry, masses, or scars and thyroid normal to palpation     RESP: lungs clear to auscultation - no rales, rhonchi or wheezes     CV: regular rate and rhythm, normal S1 S2, no S3 or S4 and no murmur, click or rub     ABDOMEN:  soft, nontender, no HSM or masses and bowel sounds normal     MS: extremities normal- no gross deformities noted, no evidence of inflammation in joints, FROM in all extremities.     SKIN: no suspicious lesions or rashes     NEURO: Normal strength and tone, sensory exam grossly normal, mentation intact and speech normal. Gait is stable.     PSYCH: mentation appears normal. and affect normal/bright     LYMPHATICS: No cervical adenopathy    No results for input(s): HGB, PLT, INR, NA, POTASSIUM, CR, A1C in the last 77919 hours.     Diagnostics:  No labs were ordered during this visit.   No EKG required, no history of coronary heart disease, significant arrhythmia, peripheral arterial disease or other structural heart disease.    Revised Cardiac Risk Index (RCRI):  The patient has the following " serious cardiovascular risks for perioperative complications:   - No serious cardiac risks = 0 points     RCRI Interpretation: 0 points: Class I (very low risk - 0.4% complication rate)       Assessment & Plan   The proposed surgical procedure is considered INTERMEDIATE risk.      ICD-10-CM    1. Pre-op exam  Z01.818    2. Superior labrum anterior-to-posterior (SLAP) tear of right shoulder  S43.431A    3. Hx of adenomatous colonic polyps  Z86.010 GASTROENTEROLOGY ADULT REF PROCEDURE ONLY   4. Colon cancer screening  Z12.11 GASTROENTEROLOGY ADULT REF PROCEDURE ONLY       Cleared for surgery.    Updated colonoscopy ordered.    RECOMMENDATION:  APPROVAL GIVEN to proceed with proposed procedure, without further diagnostic evaluation.    Signed Electronically by: Manuel Jarquin PA-C    Copy of this evaluation report is provided to requesting physician.    [facility] Pre[facility] Guidelines    Revised Cardiac Risk Index

## 2021-01-11 ENCOUNTER — OFFICE VISIT (OUTPATIENT)
Dept: FAMILY MEDICINE | Facility: OTHER | Age: 56
End: 2021-01-11
Payer: COMMERCIAL

## 2021-01-11 VITALS
DIASTOLIC BLOOD PRESSURE: 80 MMHG | OXYGEN SATURATION: 99 % | BODY MASS INDEX: 26.29 KG/M2 | HEART RATE: 68 BPM | RESPIRATION RATE: 16 BRPM | WEIGHT: 187.8 LBS | HEIGHT: 71 IN | SYSTOLIC BLOOD PRESSURE: 124 MMHG | TEMPERATURE: 97.2 F

## 2021-01-11 DIAGNOSIS — Z12.11 COLON CANCER SCREENING: ICD-10-CM

## 2021-01-11 DIAGNOSIS — Z01.818 PRE-OP EXAM: Primary | ICD-10-CM

## 2021-01-11 DIAGNOSIS — Z86.0101 HX OF ADENOMATOUS COLONIC POLYPS: ICD-10-CM

## 2021-01-11 DIAGNOSIS — S43.431A SUPERIOR LABRUM ANTERIOR-TO-POSTERIOR (SLAP) TEAR OF RIGHT SHOULDER: ICD-10-CM

## 2021-01-11 PROCEDURE — 99204 OFFICE O/P NEW MOD 45 MIN: CPT | Performed by: PHYSICIAN ASSISTANT

## 2021-01-11 ASSESSMENT — MIFFLIN-ST. JEOR: SCORE: 1709.99

## 2021-01-11 NOTE — PATIENT INSTRUCTIONS

## 2021-01-12 ENCOUNTER — TELEPHONE (OUTPATIENT)
Dept: FAMILY MEDICINE | Facility: OTHER | Age: 56
End: 2021-01-12

## 2021-01-12 NOTE — LETTER

## 2021-01-12 NOTE — LETTER
[facility]  [address]  [place] [address]  [phone]        January 12, 2021    Trev Soto  [address] [address] [address]

## 2021-01-12 NOTE — LETTER

## 2021-01-12 NOTE — TELEPHONE ENCOUNTER
Date of colonoscopy/EGD: 6/14/21  Surgeon: Dr. Sanders  Prep:Miralax  Packet:Colonoscopy/EGD instructions mailed to patient's home address.   Date: 1/12/2021      Surgery Scheduler

## 2021-02-12 ENCOUNTER — TRANSFERRED RECORDS (OUTPATIENT)
Dept: HEALTH INFORMATION MANAGEMENT | Facility: CLINIC | Age: 56
End: 2021-02-12

## 2021-03-12 ENCOUNTER — TRANSFERRED RECORDS (OUTPATIENT)
Dept: HEALTH INFORMATION MANAGEMENT | Facility: CLINIC | Age: 56
End: 2021-03-12

## 2021-04-23 ENCOUNTER — TRANSFERRED RECORDS (OUTPATIENT)
Dept: HEALTH INFORMATION MANAGEMENT | Facility: CLINIC | Age: 56
End: 2021-04-23

## 2021-06-13 DIAGNOSIS — Z11.59 ENCOUNTER FOR SCREENING FOR OTHER VIRAL DISEASES: ICD-10-CM

## 2021-07-16 ENCOUNTER — TRANSFERRED RECORDS (OUTPATIENT)
Dept: HEALTH INFORMATION MANAGEMENT | Facility: CLINIC | Age: 56
End: 2021-07-16

## 2021-07-23 ENCOUNTER — TELEPHONE (OUTPATIENT)
Dept: GASTROENTEROLOGY | Facility: CLINIC | Age: 56
End: 2021-07-23

## 2021-07-23 NOTE — TELEPHONE ENCOUNTER
Caller: Valeriano    Procedure: Colonoscopy    Date of Procedure Cancelled: 8/2    Ordering Provider:Manuel Jarquin PA-C    Reason for cancel: Patient had mandatory work conflict came up.    Rescheduled: Yes     If rescheduled     Date: 1/10/22    Location:     Note any change or update to original order/sedation:             Patient need reminder to schedule covid test if needed.

## 2021-07-25 DIAGNOSIS — Z11.59 ENCOUNTER FOR SCREENING FOR OTHER VIRAL DISEASES: ICD-10-CM

## 2021-12-31 ENCOUNTER — TELEPHONE (OUTPATIENT)
Dept: GASTROENTEROLOGY | Facility: CLINIC | Age: 56
End: 2021-12-31
Payer: COMMERCIAL

## 2021-12-31 DIAGNOSIS — Z11.59 ENCOUNTER FOR SCREENING FOR OTHER VIRAL DISEASES: ICD-10-CM

## 2021-12-31 NOTE — TELEPHONE ENCOUNTER
Caller: Trev Soto    Procedure: colon    Date, Location, and Surgeon of Procedure Cancelled: 1/10/22 [facility]    Ordering Provider:Britton    Reason for cancel (please be detailed, any staff messages or encounters to note?): pt didn't say just couldn't do that day-he was positive for covid 19 as well on 12/27/21 so no new covid test will be need for upcoming procedure        Rescheduled: Y     If rescheduled:    Date: 1/24/22   Location:    Note any change or update to original order/sedation:

## 2022-01-15 ENCOUNTER — HEALTH MAINTENANCE LETTER (OUTPATIENT)
Age: 57
End: 2022-01-15

## 2022-01-17 ENCOUNTER — VIRTUAL VISIT (OUTPATIENT)
Dept: FAMILY MEDICINE | Facility: OTHER | Age: 57
End: 2022-01-17
Payer: COMMERCIAL

## 2022-01-17 DIAGNOSIS — J30.1 SEASONAL ALLERGIC RHINITIS DUE TO POLLEN: ICD-10-CM

## 2022-01-17 DIAGNOSIS — R09.82 POST-NASAL DRAINAGE: Primary | ICD-10-CM

## 2022-01-17 PROCEDURE — 99213 OFFICE O/P EST LOW 20 MIN: CPT | Mod: TEL | Performed by: PHYSICIAN ASSISTANT

## 2022-01-17 RX ORDER — FLUTICASONE PROPIONATE 50 MCG
1 SPRAY, SUSPENSION (ML) NASAL DAILY
Qty: 9.9 ML | Refills: 1 | Status: SHIPPED | OUTPATIENT
Start: 2022-01-17 | End: 2022-06-07

## 2022-01-17 RX ORDER — LORATADINE 10 MG/1
10 TABLET ORAL DAILY
Qty: 90 TABLET | Refills: 1 | Status: SHIPPED | OUTPATIENT
Start: 2022-01-17

## 2022-01-17 NOTE — PROGRESS NOTES
Valeriano is a 56 year old who is being evaluated via a billable telephone visit.      What phone number would you like to be contacted at? [phone]  How would you like to obtain your AVS? MyChart    Assessment & Plan       ICD-10-CM    1. Post-nasal drainage  R09.82 fluticasone (FLONASE) 50 MCG/ACT nasal spray     loratadine (CLARITIN) 10 MG tablet   2. Seasonal allergic rhinitis due to pollen  J30.1 fluticasone (FLONASE) 50 MCG/ACT nasal spray     loratadine (CLARITIN) 10 MG tablet       1-2. Persistent postnasal drainage after his COVID diagnosis last month but symptoms are improving significantly. Given his improvement, I do not feel an antibiotic is currently warranted but will prescribe Flonase and Claritin to use daily as directed. This should help with his chroinc allergies as well which are likely playing a role in his symptoms. If symptoms are not further improving by Friday, can consider an antibiotic for sinusitis.    He will schedule a physical within the next few months.    TAHIR [facility]   Valeriano is a 56 year old who presents for the following health issues     HPI     He was diagnosed with COVID-19 on 12/28 with symptoms starting on Christmas. His symptoms have since improved but a persistent postnasal drip remains. It has improved significantly even in the past few days but he is wondering if he should be treating it with something like a nasal spray or antihistamine. No fevers or chills. Drainage is typically thick and yellowish.       Review of Systems   Constitutional, HEENT, cardiovascular, pulmonary, gi and gu systems are negative, except as otherwise noted.       Objective       Vitals:  No vitals were obtained today due to virtual visit.    Physical Exam   healthy, alert and no distress  PSYCH: Alert and oriented times 3; coherent speech, normal   rate and volume, able to articulate logical thoughts, able   to abstract reason, no tangential  thoughts, no hallucinations   or delusions. His affect is normal.   RESP: No cough, no audible wheezing, able to talk in full sentences  Remainder of exam unable to be completed due to telephone visits        Phone call duration: 9 minutes

## 2022-01-17 NOTE — PATIENT INSTRUCTIONS
Will prescribe Flonase and Claritin.  If symptoms not further improving by Friday, can send over an antibiotic.    Follow up within the next few months for an annual physical.

## 2022-01-23 ENCOUNTER — ANESTHESIA EVENT (OUTPATIENT)
Dept: GASTROENTEROLOGY | Facility: CLINIC | Age: 57
End: 2022-01-23
Payer: COMMERCIAL

## 2022-01-24 ENCOUNTER — ANESTHESIA (OUTPATIENT)
Dept: GASTROENTEROLOGY | Facility: CLINIC | Age: 57
End: 2022-01-24
Payer: COMMERCIAL

## 2022-01-24 ENCOUNTER — HOSPITAL ENCOUNTER (OUTPATIENT)
Facility: CLINIC | Age: 57
Discharge: HOME | End: 2022-01-24
Attending: FAMILY MEDICINE | Admitting: FAMILY MEDICINE
Payer: COMMERCIAL

## 2022-01-24 VITALS
HEART RATE: 69 BPM | DIASTOLIC BLOOD PRESSURE: 82 MMHG | BODY MASS INDEX: 26.15 KG/M2 | WEIGHT: 187.8 LBS | TEMPERATURE: 97.6 F | RESPIRATION RATE: 16 BRPM | SYSTOLIC BLOOD PRESSURE: 110 MMHG | OXYGEN SATURATION: 98 %

## 2022-01-24 LAB — COLONOSCOPY: NORMAL

## 2022-01-24 PROCEDURE — 250N000009 HC RX 250: Performed by: NURSE ANESTHETIST, CERTIFIED REGISTERED

## 2022-01-24 PROCEDURE — 250N000009 HC RX 250: Performed by: FAMILY MEDICINE

## 2022-01-24 PROCEDURE — 250N000011 HC RX IP 250 OP 636: Performed by: NURSE ANESTHETIST, CERTIFIED REGISTERED

## 2022-01-24 PROCEDURE — G0105 COLORECTAL SCRN; HI RISK IND: HCPCS | Performed by: FAMILY MEDICINE

## 2022-01-24 PROCEDURE — 370N000017 HC ANESTHESIA TECHNICAL FEE, PER MIN: Performed by: FAMILY MEDICINE

## 2022-01-24 PROCEDURE — 45378 DIAGNOSTIC COLONOSCOPY: CPT | Performed by: FAMILY MEDICINE

## 2022-01-24 PROCEDURE — 258N000003 HC RX IP 258 OP 636: Performed by: NURSE ANESTHETIST, CERTIFIED REGISTERED

## 2022-01-24 RX ORDER — NALOXONE HYDROCHLORIDE 0.4 MG/ML
0.2 INJECTION, SOLUTION INTRAMUSCULAR; INTRAVENOUS; SUBCUTANEOUS
Status: DISCONTINUED | OUTPATIENT
Start: 2022-01-24 | End: 2022-01-24 | Stop reason: HOSPADM

## 2022-01-24 RX ORDER — ONDANSETRON 4 MG/1
4 TABLET, ORALLY DISINTEGRATING ORAL EVERY 6 HOURS PRN
Status: DISCONTINUED | OUTPATIENT
Start: 2022-01-24 | End: 2022-01-24 | Stop reason: HOSPADM

## 2022-01-24 RX ORDER — NALOXONE HYDROCHLORIDE 0.4 MG/ML
0.4 INJECTION, SOLUTION INTRAMUSCULAR; INTRAVENOUS; SUBCUTANEOUS
Status: DISCONTINUED | OUTPATIENT
Start: 2022-01-24 | End: 2022-01-24 | Stop reason: HOSPADM

## 2022-01-24 RX ORDER — PROCHLORPERAZINE MALEATE 5 MG
10 TABLET ORAL EVERY 6 HOURS PRN
Status: DISCONTINUED | OUTPATIENT
Start: 2022-01-24 | End: 2022-01-24 | Stop reason: HOSPADM

## 2022-01-24 RX ORDER — FLUMAZENIL 0.1 MG/ML
0.2 INJECTION, SOLUTION INTRAVENOUS
Status: DISCONTINUED | OUTPATIENT
Start: 2022-01-24 | End: 2022-01-24 | Stop reason: HOSPADM

## 2022-01-24 RX ORDER — LIDOCAINE 40 MG/G
CREAM TOPICAL
Status: DISCONTINUED | OUTPATIENT
Start: 2022-01-24 | End: 2022-01-24 | Stop reason: HOSPADM

## 2022-01-24 RX ORDER — LIDOCAINE HYDROCHLORIDE 20 MG/ML
INJECTION, SOLUTION INFILTRATION; PERINEURAL PRN
Status: DISCONTINUED | OUTPATIENT
Start: 2022-01-24 | End: 2022-01-24

## 2022-01-24 RX ORDER — ONDANSETRON 2 MG/ML
4 INJECTION INTRAMUSCULAR; INTRAVENOUS EVERY 6 HOURS PRN
Status: DISCONTINUED | OUTPATIENT
Start: 2022-01-24 | End: 2022-01-24 | Stop reason: HOSPADM

## 2022-01-24 RX ORDER — PROPOFOL 10 MG/ML
INJECTION, EMULSION INTRAVENOUS PRN
Status: DISCONTINUED | OUTPATIENT
Start: 2022-01-24 | End: 2022-01-24

## 2022-01-24 RX ORDER — PROPOFOL 10 MG/ML
INJECTION, EMULSION INTRAVENOUS CONTINUOUS PRN
Status: DISCONTINUED | OUTPATIENT
Start: 2022-01-24 | End: 2022-01-24

## 2022-01-24 RX ORDER — SODIUM CHLORIDE, SODIUM LACTATE, POTASSIUM CHLORIDE, CALCIUM CHLORIDE 600; 310; 30; 20 MG/100ML; MG/100ML; MG/100ML; MG/100ML
INJECTION, SOLUTION INTRAVENOUS CONTINUOUS
Status: DISCONTINUED | OUTPATIENT
Start: 2022-01-24 | End: 2022-01-24 | Stop reason: HOSPADM

## 2022-01-24 RX ORDER — ONDANSETRON 2 MG/ML
4 INJECTION INTRAMUSCULAR; INTRAVENOUS
Status: DISCONTINUED | OUTPATIENT
Start: 2022-01-24 | End: 2022-01-24 | Stop reason: HOSPADM

## 2022-01-24 RX ADMIN — PROPOFOL 50 MG: 10 INJECTION, EMULSION INTRAVENOUS at 10:18

## 2022-01-24 RX ADMIN — PROPOFOL 150 MCG/KG/MIN: 10 INJECTION, EMULSION INTRAVENOUS at 10:18

## 2022-01-24 RX ADMIN — PROPOFOL 20 MG: 10 INJECTION, EMULSION INTRAVENOUS at 10:22

## 2022-01-24 RX ADMIN — LIDOCAINE HYDROCHLORIDE 40 MG: 20 INJECTION, SOLUTION INFILTRATION; PERINEURAL at 10:18

## 2022-01-24 RX ADMIN — LIDOCAINE HYDROCHLORIDE 1 ML: 10 INJECTION, SOLUTION EPIDURAL; INFILTRATION; INTRACAUDAL; PERINEURAL at 09:01

## 2022-01-24 RX ADMIN — SODIUM CHLORIDE, POTASSIUM CHLORIDE, SODIUM LACTATE AND CALCIUM CHLORIDE: 600; 310; 30; 20 INJECTION, SOLUTION INTRAVENOUS at 09:01

## 2022-01-24 NOTE — DISCHARGE INSTRUCTIONS
[facility]    Home Care Following Endoscopy          Activity:    You have just undergone an endoscopic procedure usually performed with conscious sedation.  Do not work or operate machinery (including a car) for at least 12 hours.      I encourage you to walk and attempt to pass this air as soon as possible.    Diet:    Return to the diet you were on before your procedure but eat lightly for the first 12-24 hours.    Drink plenty of water.    Resume any regular medications unless otherwise advised by your physician.  Please begin any new medication prescribed as a result of your procedure as directed by your physician.     If you had any biopsy or polyp removed please refrain from aspirin or aspirin products for 2 days.  If on Coumadin please restart as instructed by your physician.   Pain:    You may take Tylenol as needed for pain.  Expected Recovery:    You can expect some mild abdominal fullness and/or discomfort due to the air used to inflate your intestinal tract.     Call Your Physician if You Have:    After Colonoscopy:  o Worsening persisting abdominal pain which is worse with activity.  o Fevers (>101 degrees F), chills or shakes.  o Passage of continued blood with bowel movements.     Any questions or concerns about your recovery, please call [phone] or after hours [phone] Nurse Advice Line.    Follow-up Care:  You had a clean colon.  No specimens were removed.  Refer to your procedure report.

## 2022-01-24 NOTE — H&P
"Pre-Endoscopy History and Physical     Trev Soto MRN# [id number]   YOB: 1965 Age: 56 year old     Date of Procedure: (Not on file)  Primary care provider: Manuel Jarquin  Type of Endoscopy: colonoscopy  Type of Anesthesia Anticipated: MAC     HPI:    Trev is a 56 year old male who was referred to me for colonoscopy.    Trev is feeling well today.      Patient Active Problem List   Diagnosis     CARDIOVASCULAR SCREENING; LDL GOAL LESS THAN 160     Lactose intolerance in adult     Seasonal allergic rhinitis due to pollen     Allergic rhinitis due to dust mite     Allergic rhinitis due to dogs            PHYSICAL EXAM:   There were no vitals taken for this visit.   Estimated body mass index is 26.15 kg/m  as calculated from the following:    Height as of 1/11/21: 1.805 m (5' 11.06\").    Weight as of 1/11/21: 85.2 kg (187 lb 12.8 oz).    GENERAL APPEARANCE: alert and no distress. Appears to comprehend the procedure and indication  RESP: lungs unlabored  CV: regular  ABD: soft, nt/nd    DIAGNOSTICS:      COVID-19 PCR Results    COVID-19 PCR Results   No data to display.         COVID-19 Antibody Results, Testing for Immunity    COVID-19 Antibody Results, Testing for Immunity   No data to display.              IMPRESSION   ASA Class 1 - Healthy patient, no medical problems        PLAN:     Plan for colonoscopy. No medical contraindications to proceed, or further work up needed. The risks and benefits of the procedure and the sedation options and risks were discussed with the patient. These include infection, bleeding, and small risk of colon perforation (1/1000 to 1/48663 depending on patient characteristics and type of procedure). Trev was also explained to alternatives for colo-rectal screening. All questions were answered and informed consent was obtained.      The above has been forwarded to the consulting provider.      Signed Electronically by: Larry Sanders MD  January " 24, 2022

## 2022-01-24 NOTE — ANESTHESIA PREPROCEDURE EVALUATION
Anesthesia Pre-Procedure Evaluation    Patient: Trev Soto   MRN: [id number] : 1965        Preoperative Diagnosis: Special screening for malignant neoplasms, colon [Z12.11]  Personal history of colonic polyps [Z86.010]    Procedure : Procedure(s):  COLONOSCOPY          No past medical history on file.   Past Surgical History:   Procedure Laterality Date     Z ANESTH,KNEE AREA SURGERY      Tobin Procedure, left knee     ZZC APPENDECTOMY        No Known Allergies   Social History     Tobacco Use     Smoking status: Never Smoker     Smokeless tobacco: Never Used   Substance Use Topics     Alcohol use: No      Wt Readings from Last 1 Encounters:   21 85.2 kg (187 lb 12.8 oz)        Anesthesia Evaluation   Pt has had prior anesthetic. Type: General.        ROS/MED HX  ENT/Pulmonary:     (+) allergic rhinitis,     Neurologic:  - neg neurologic ROS     Cardiovascular:  - neg cardiovascular ROS     METS/Exercise Tolerance:     Hematologic:  - neg hematologic  ROS     Musculoskeletal:  - neg musculoskeletal ROS     GI/Hepatic:  - neg GI/hepatic ROS     Renal/Genitourinary:  - neg Renal ROS     Endo:  - neg endo ROS     Psychiatric/Substance Use:  - neg psychiatric ROS     Infectious Disease:  - neg infectious disease ROS     Malignancy:  - neg malignancy ROS     Other:            Physical Exam    Airway  airway exam normal      Mallampati: II   TM distance: > 3 FB   Neck ROM: full   Mouth opening: > 3 cm    Respiratory Devices and Support         Dental  no notable dental history         Cardiovascular   cardiovascular exam normal       Rhythm and rate: regular and normal     Pulmonary   pulmonary exam normal        breath sounds clear to auscultation           OUTSIDE LABS:  CBC: No results found for: WBC, HGB, HCT, PLT  BMP:   Lab Results   Component Value Date     2016     2012    POTASSIUM 3.9 2016    POTASSIUM 4.0 2012    CHLORIDE 104 2016     CHLORIDE 104 09/20/2012    CO2 30 02/09/2016    CO2 31 09/20/2012    BUN 9 02/09/2016    BUN 13 09/20/2012    CR 1.17 02/09/2016    CR 1.33 (H) 09/20/2012    GLC 90 02/09/2016    GLC 78 09/20/2012     COAGS: No results found for: PTT, INR, FIBR  POC: No results found for: BGM, HCG, HCGS  HEPATIC:   Lab Results   Component Value Date    ALBUMIN 4.0 02/09/2016    PROTTOTAL 7.3 02/09/2016    ALT 23 02/09/2016    AST 13 02/09/2016    ALKPHOS 58 02/09/2016    BILITOTAL 0.4 02/09/2016     OTHER:   Lab Results   Component Value Date    STACIA 9.0 02/09/2016    TSH 1.69 02/09/2016       Anesthesia Plan    ASA Status:  1   NPO Status:  NPO Appropriate    Anesthesia Type: MAC.     - Reason for MAC: straight local not clinically adequate   Induction: Intravenous, Propofol.   Maintenance: TIVA.        Consents    Anesthesia Plan(s) and associated risks, benefits, and realistic alternatives discussed. Questions answered and patient/representative(s) expressed understanding.    - Discussed:     - Discussed with:  Patient      - Extended Intubation/Ventilatory Support Discussed: No.      - Patient is DNR/DNI Status: No    Use of blood products discussed: No .     Postoperative Care    Pain management: Oral pain medications.        Comments:    Other Comments: The risks and benefits of anesthesia, and the alternatives where applicable, have been discussed with the patient, and they wish to proceed.            DURGA Pozo CRNA

## 2022-01-24 NOTE — ANESTHESIA POSTPROCEDURE EVALUATION
Patient: Trev Soto    Procedure: Procedure(s):  COLONOSCOPY       Diagnosis:Special screening for malignant neoplasms, colon [Z12.11]  Personal history of colonic polyps [Z86.010]  Diagnosis Additional Information: No value filed.    Anesthesia Type:  MAC    Note:  Disposition: Outpatient   Postop Pain Control: Uneventful            Sign Out: Well controlled pain   PONV: No   Neuro/Psych: Uneventful            Sign Out: Acceptable/Baseline neuro status   Airway/Respiratory: Uneventful            Sign Out: Acceptable/Baseline resp. status   CV/Hemodynamics: Uneventful            Sign Out: Acceptable CV status   Other NRE: NONE   DID A NON-ROUTINE EVENT OCCUR? No    Event details/Postop Comments:  Pt was happy with anesthesia care.  No complications.  I will follow up with the pt if needed.           Last vitals:  Vitals Value Taken Time   /66 01/24/22 1041   Temp     Pulse 72 01/24/22 1041   Resp 14 01/24/22 1041   SpO2 96 % 01/24/22 1048   Vitals shown include unvalidated device data.    Electronically Signed By: DURGA Pozo CRNA  January 24, 2022  10:49 AM

## 2022-01-24 NOTE — ANESTHESIA CARE TRANSFER NOTE
Patient: Trev Soto    Procedure: Procedure(s):  COLONOSCOPY       Diagnosis: Special screening for malignant neoplasms, colon [Z12.11]  Personal history of colonic polyps [Z86.010]  Diagnosis Additional Information: No value filed.    Anesthesia Type:   MAC     Note:    Oropharynx: oropharynx clear of all foreign objects and spontaneously breathing  Level of Consciousness: drowsy  Oxygen Supplementation: room air    Independent Airway: airway patency satisfactory and stable  Dentition: dentition unchanged  Vital Signs Stable: post-procedure vital signs reviewed and stable  Report to RN Given: handoff report given  Patient transferred to: Phase II    Handoff Report: Identifed the Patient, Identified the Reponsible Provider, Reviewed the pertinent medical history, Discussed the surgical course, Reviewed Intra-OP anesthesia mangement and issues during anesthesia, Set expectations for post-procedure period and Allowed opportunity for questions and acknowledgement of understanding      Vitals:  Vitals Value Taken Time   /66 01/24/22 1041   Temp     Pulse 72 01/24/22 1041   Resp 14 01/24/22 1041   SpO2 96 % 01/24/22 1047   Vitals shown include unvalidated device data.    Electronically Signed By: DURGA Pozo CRNA  January 24, 2022  10:48 AM

## 2022-06-05 DIAGNOSIS — J30.1 SEASONAL ALLERGIC RHINITIS DUE TO POLLEN: ICD-10-CM

## 2022-06-05 DIAGNOSIS — R09.82 POST-NASAL DRAINAGE: ICD-10-CM

## 2022-06-07 RX ORDER — FLUTICASONE PROPIONATE 50 MCG
SPRAY, SUSPENSION (ML) NASAL
Qty: 16 G | Refills: 1 | Status: SHIPPED | OUTPATIENT
Start: 2022-06-07

## 2022-12-26 ENCOUNTER — OFFICE VISIT (OUTPATIENT)
Dept: FAMILY MEDICINE | Facility: OTHER | Age: 57
End: 2022-12-26
Payer: COMMERCIAL

## 2022-12-26 ENCOUNTER — HEALTH MAINTENANCE LETTER (OUTPATIENT)
Age: 57
End: 2022-12-26

## 2022-12-26 VITALS
OXYGEN SATURATION: 98 % | SYSTOLIC BLOOD PRESSURE: 100 MMHG | HEART RATE: 58 BPM | HEIGHT: 70 IN | TEMPERATURE: 96.7 F | BODY MASS INDEX: 27.2 KG/M2 | WEIGHT: 190 LBS | DIASTOLIC BLOOD PRESSURE: 70 MMHG

## 2022-12-26 DIAGNOSIS — R59.0 INGUINAL ADENOPATHY: ICD-10-CM

## 2022-12-26 DIAGNOSIS — Z12.5 SCREENING FOR PROSTATE CANCER: ICD-10-CM

## 2022-12-26 DIAGNOSIS — Z13.1 SCREENING FOR DIABETES MELLITUS: ICD-10-CM

## 2022-12-26 DIAGNOSIS — Z13.220 SCREENING FOR HYPERLIPIDEMIA: ICD-10-CM

## 2022-12-26 DIAGNOSIS — Z00.00 ENCOUNTER FOR ROUTINE ADULT HEALTH EXAMINATION WITHOUT ABNORMAL FINDINGS: Primary | ICD-10-CM

## 2022-12-26 LAB
ANION GAP SERPL CALCULATED.3IONS-SCNC: 3 MMOL/L (ref 3–14)
BASOPHILS # BLD AUTO: 0.1 10E3/UL (ref 0–0.2)
BASOPHILS NFR BLD AUTO: 1 %
BUN SERPL-MCNC: 12 MG/DL (ref 7–30)
CALCIUM SERPL-MCNC: 8.8 MG/DL (ref 8.5–10.1)
CHLORIDE BLD-SCNC: 106 MMOL/L (ref 94–109)
CHOLEST SERPL-MCNC: 227 MG/DL
CO2 SERPL-SCNC: 31 MMOL/L (ref 20–32)
CREAT SERPL-MCNC: 1.17 MG/DL (ref 0.66–1.25)
EOSINOPHIL # BLD AUTO: 0.2 10E3/UL (ref 0–0.7)
EOSINOPHIL NFR BLD AUTO: 4 %
ERYTHROCYTE [DISTWIDTH] IN BLOOD BY AUTOMATED COUNT: 12.7 % (ref 10–15)
FASTING STATUS PATIENT QL REPORTED: YES
GFR SERPL CREATININE-BSD FRML MDRD: 73 ML/MIN/1.73M2
GLUCOSE BLD-MCNC: 96 MG/DL (ref 70–99)
HCT VFR BLD AUTO: 46.3 % (ref 40–53)
HDLC SERPL-MCNC: 47 MG/DL
HGB BLD-MCNC: 15.4 G/DL (ref 13.3–17.7)
LDLC SERPL CALC-MCNC: 138 MG/DL
LYMPHOCYTES # BLD AUTO: 1.7 10E3/UL (ref 0.8–5.3)
LYMPHOCYTES NFR BLD AUTO: 30 %
MCH RBC QN AUTO: 29.3 PG (ref 26.5–33)
MCHC RBC AUTO-ENTMCNC: 33.3 G/DL (ref 31.5–36.5)
MCV RBC AUTO: 88 FL (ref 78–100)
MONOCYTES # BLD AUTO: 0.6 10E3/UL (ref 0–1.3)
MONOCYTES NFR BLD AUTO: 11 %
NEUTROPHILS # BLD AUTO: 3 10E3/UL (ref 1.6–8.3)
NEUTROPHILS NFR BLD AUTO: 54 %
NONHDLC SERPL-MCNC: 180 MG/DL
PLATELET # BLD AUTO: 256 10E3/UL (ref 150–450)
POTASSIUM BLD-SCNC: 4.1 MMOL/L (ref 3.4–5.3)
PSA SERPL-MCNC: 1.35 UG/L (ref 0–4)
RBC # BLD AUTO: 5.26 10E6/UL (ref 4.4–5.9)
SODIUM SERPL-SCNC: 140 MMOL/L (ref 133–144)
TRIGL SERPL-MCNC: 210 MG/DL
WBC # BLD AUTO: 5.6 10E3/UL (ref 4–11)

## 2022-12-26 PROCEDURE — G0103 PSA SCREENING: HCPCS | Performed by: PHYSICIAN ASSISTANT

## 2022-12-26 PROCEDURE — 99213 OFFICE O/P EST LOW 20 MIN: CPT | Mod: 25 | Performed by: PHYSICIAN ASSISTANT

## 2022-12-26 PROCEDURE — 80061 LIPID PANEL: CPT | Performed by: PHYSICIAN ASSISTANT

## 2022-12-26 PROCEDURE — 36415 COLL VENOUS BLD VENIPUNCTURE: CPT | Performed by: PHYSICIAN ASSISTANT

## 2022-12-26 PROCEDURE — 85025 COMPLETE CBC W/AUTO DIFF WBC: CPT | Performed by: PHYSICIAN ASSISTANT

## 2022-12-26 PROCEDURE — 80048 BASIC METABOLIC PNL TOTAL CA: CPT | Performed by: PHYSICIAN ASSISTANT

## 2022-12-26 PROCEDURE — 99396 PREV VISIT EST AGE 40-64: CPT | Mod: 25 | Performed by: PHYSICIAN ASSISTANT

## 2022-12-26 ASSESSMENT — ENCOUNTER SYMPTOMS
EYE PAIN: 0
JOINT SWELLING: 0
MYALGIAS: 0
ABDOMINAL PAIN: 0
CHILLS: 0
NERVOUS/ANXIOUS: 0
DIARRHEA: 0
FREQUENCY: 0
WEAKNESS: 0
HEMATURIA: 0
PALPITATIONS: 0
NAUSEA: 0
HEMATOCHEZIA: 0
PARESTHESIAS: 0
CONSTIPATION: 0
DIZZINESS: 0
FEVER: 0
COUGH: 0
DYSURIA: 0
ARTHRALGIAS: 0
HEARTBURN: 0
HEADACHES: 0
SORE THROAT: 0
SHORTNESS OF BREATH: 0

## 2022-12-26 ASSESSMENT — PAIN SCALES - GENERAL: PAINLEVEL: NO PAIN (0)

## 2022-12-26 NOTE — PROGRESS NOTES
SUBJECTIVE:   CC: Valeriano is an 57 year old who presents for preventative health visit.   Patient has been advised of split billing requirements and indicates understanding: Yes  Healthy Habits:     Getting at least 3 servings of Calcium per day:  Yes    Bi-annual eye exam:  NO    Dental care twice a year:  Yes    Sleep apnea or symptoms of sleep apnea:  None    Diet:  Regular (no restrictions)    Frequency of exercise:  2-3 days/week    Duration of exercise:  30-45 minutes    Taking medications regularly:  Not Applicable    Medication side effects:  Not applicable and None    PHQ-2 Total Score: 0    Additional concerns today:  No    No new concerns today as he states everything has been going very well.    Today's PHQ-2 Score:   PHQ-2 ( 1999 [employer]) 12/26/2022   Q1: Little interest or pleasure in doing things 0   Q2: Feeling down, depressed or hopeless 0   PHQ-2 Score 0   Q1: Little interest or pleasure in doing things Not at all   Q2: Feeling down, depressed or hopeless Not at all   PHQ-2 Score 0       Have you ever done Advance Care Planning? (For example, a Health Directive, POLST, or a discussion with a medical provider or your loved ones about your wishes): Yes, patient states has an Advance Care Planning document and will bring a copy to the clinic.    Social History     Tobacco Use     Smoking status: Never     Smokeless tobacco: Never   Substance Use Topics     Alcohol use: No     If you drink alcohol do you typically have >3 drinks per day or >7 drinks per week? No    Alcohol Use 12/26/2022   Prescreen: >3 drinks/day or >7 drinks/week? No   Prescreen: >3 drinks/day or >7 drinks/week? -       Last PSA:   PSA   Date Value Ref Range Status   02/09/2016 1.09 0 - 4 ug/L Final       Reviewed orders with patient. Reviewed health maintenance and updated orders accordingly - Yes    Reviewed and updated as needed this visit by clinical staff   Tobacco  Allergies  Meds  Problems  Med Hx  Surg Hx  Fam Hx       "    Reviewed and updated as needed this visit by Provider   Tobacco  Allergies  Meds  Problems  Med Hx  Surg Hx  Fam Hx           Review of Systems   Constitutional: Negative for chills and fever.   HENT: Negative for congestion, ear pain, hearing loss and sore throat.    Eyes: Negative for pain and visual disturbance.   Respiratory: Negative for cough and shortness of breath.    Cardiovascular: Negative for chest pain, palpitations and peripheral edema.   Gastrointestinal: Negative for abdominal pain, constipation, diarrhea, heartburn, hematochezia and nausea.   Genitourinary: Negative for dysuria, frequency, genital sores, hematuria, impotence, penile discharge and urgency.   Musculoskeletal: Negative for arthralgias, joint swelling and myalgias.   Skin: Negative for rash.   Neurological: Negative for dizziness, weakness, headaches and paresthesias.   Psychiatric/Behavioral: Negative for mood changes. The patient is not nervous/anxious.        OBJECTIVE:   /70 (BP Location: Right arm, Patient Position: Sitting, Cuff Size: Adult Large)   Pulse 58   Temp (!) 96.7  F (35.9  C) (Temporal)   Ht 1.79 m (5' 10.47\")   Wt 86.2 kg (190 lb)   SpO2 98%   BMI 26.90 kg/m      Physical Exam  GENERAL: healthy, alert and no distress  EYES: Eyes grossly normal to inspection, PERRL and conjunctivae and sclerae normal  HENT: ear canals and TM's normal, nose and mouth without ulcers or lesions  NECK: no adenopathy, no asymmetry, masses, or scars and thyroid normal to palpation  RESP: lungs clear to auscultation - no rales, rhonchi or wheezes  CV: regular rate and rhythm, normal S1 S2, no S3 or S4, no murmur, click or rub, no peripheral edema and peripheral pulses strong  ABDOMEN: soft, nontender, no hepatosplenomegaly, no masses and bowel sounds normal   (male): normal male circumcised genitalia without lesions or urethral discharge. Minimally tender, right inguinal nodule/adenopathy. No obvious hernia.   MS: no " gross musculoskeletal defects noted, no edema. FROM to all extremities. No spinal tenderness.   SKIN: no suspicious lesions or rashes  NEURO: Normal strength and tone, mentation intact and speech normal. Cranial nerves II-XII are grossly intact. DTRs are 2+/4 throughout and symmetric. Gait is stable.   PSYCH: mentation appears normal, affect normal/bright    ASSESSMENT/PLAN:       ICD-10-CM    1. Encounter for routine adult health examination without abnormal findings  Z00.00       2. Inguinal adenopathy  R59.0 CBC with platelets and differential     US Hernia Evaluation     CBC with platelets and differential     Basic metabolic panel  (Ca, Cl, CO2, Creat, Gluc, K, Na, BUN)      3. Screening for hyperlipidemia  Z13.220 Lipid panel reflex to direct LDL Non-fasting     Lipid panel reflex to direct LDL Non-fasting      4. Screening for diabetes mellitus  Z13.1 Basic metabolic panel  (Ca, Cl, CO2, Creat, Gluc, K, Na, BUN)      5. Screening for prostate cancer  Z12.5 PSA, screen     PSA, screen          2. Minimally tender right inguinal adenopathy versus small nodule versus small hernia noted on exam today. I recommend a CBC with diff along with an ultrasound for further evaluation. This is likely a benign finding but is asymmetrical so requires further work up. He denies any urinary symptoms or other concerning systemic symptoms.    3-5. Screening fasting labs ordered.    Declines influenza vaccine and COVID booster today.    Follow-up annually.          Patient has been advised of split billing requirements and indicates understanding: Yes      COUNSELING:   Reviewed preventive health counseling, as reflected in patient instructions       Regular exercise       Healthy diet/nutrition        He reports that he has never smoked. He has never used smokeless tobacco.            TAHIR Barba [facility]

## 2022-12-26 NOTE — PATIENT INSTRUCTIONS
Preventive Health Recommendations  Male Ages 50 - 64    Yearly exam:             See your health care provider every year in order to  o   Review health changes.   o   Discuss preventive care.    o   Review your medicines if your doctor has prescribed any.   Have a cholesterol test every 5 years, or more frequently if you are at risk for high cholesterol/heart disease.   Have a diabetes test (fasting glucose) every three years. If you are at risk for diabetes, you should have this test more often.   Have a colonoscopy at age 50, or have a yearly FIT test (stool test). These exams will check for colon cancer.    Talk with your health care provider about whether or not a prostate cancer screening test (PSA) is right for you.  You should be tested each year for STDs (sexually transmitted diseases), if you re at risk.     Shots: Get a flu shot each year. Get a tetanus shot every 10 years.     Nutrition:  Eat at least 5 servings of fruits and vegetables daily.   Eat whole-grain bread, whole-wheat pasta and brown rice instead of white grains and rice.   Get adequate Calcium and Vitamin D.     Lifestyle  Exercise for at least 150 minutes a week (30 minutes a day, 5 days a week). This will help you control your weight and prevent disease.   Limit alcohol to one drink per day.   No smoking.   Wear sunscreen to prevent skin cancer.   See your dentist every six months for an exam and cleaning.   See your eye doctor every 1 to 2 years.    Will order an ultrasound of the enlarged lymph node area to make sure this is nothing to be concerned with.  Will get updated labs today.    Follow-up annually.

## 2022-12-28 ENCOUNTER — ANCILLARY PROCEDURE (OUTPATIENT)
Dept: ULTRASOUND IMAGING | Facility: OTHER | Age: 57
End: 2022-12-28
Attending: PHYSICIAN ASSISTANT
Payer: COMMERCIAL

## 2022-12-28 DIAGNOSIS — R59.0 INGUINAL ADENOPATHY: ICD-10-CM

## 2022-12-28 DIAGNOSIS — R59.0 INGUINAL ADENOPATHY: Primary | ICD-10-CM

## 2022-12-28 PROCEDURE — 76705 ECHO EXAM OF ABDOMEN: CPT | Mod: TC | Performed by: RADIOLOGY

## 2022-12-30 ENCOUNTER — ANCILLARY PROCEDURE (OUTPATIENT)
Dept: CT IMAGING | Facility: CLINIC | Age: 57
End: 2022-12-30
Attending: PHYSICIAN ASSISTANT
Payer: COMMERCIAL

## 2022-12-30 DIAGNOSIS — R59.0 INGUINAL ADENOPATHY: ICD-10-CM

## 2022-12-30 PROCEDURE — 74177 CT ABD & PELVIS W/CONTRAST: CPT | Mod: TC | Performed by: RADIOLOGY

## 2022-12-30 RX ORDER — IOPAMIDOL 755 MG/ML
116 INJECTION, SOLUTION INTRAVASCULAR ONCE
Status: COMPLETED | OUTPATIENT
Start: 2022-12-30 | End: 2022-12-30

## 2022-12-30 RX ADMIN — IOPAMIDOL 116 ML: 755 INJECTION, SOLUTION INTRAVASCULAR at 09:14

## 2023-01-02 ENCOUNTER — MYC MEDICAL ADVICE (OUTPATIENT)
Dept: FAMILY MEDICINE | Facility: OTHER | Age: 58
End: 2023-01-02

## 2023-10-31 ENCOUNTER — PATIENT OUTREACH (OUTPATIENT)
Dept: GASTROENTEROLOGY | Facility: CLINIC | Age: 58
End: 2023-10-31
Payer: COMMERCIAL

## 2023-11-27 ENCOUNTER — PATIENT OUTREACH (OUTPATIENT)
Dept: CARE COORDINATION | Facility: CLINIC | Age: 58
End: 2023-11-27
Payer: COMMERCIAL

## 2024-02-04 ENCOUNTER — HEALTH MAINTENANCE LETTER (OUTPATIENT)
Age: 59
End: 2024-02-04

## 2024-06-03 ENCOUNTER — MYC MEDICAL ADVICE (OUTPATIENT)
Dept: FAMILY MEDICINE | Facility: OTHER | Age: 59
End: 2024-06-03

## 2024-06-03 ENCOUNTER — E-VISIT (OUTPATIENT)
Dept: URGENT CARE | Facility: CLINIC | Age: 59
End: 2024-06-03
Payer: COMMERCIAL

## 2024-06-03 DIAGNOSIS — R06.02 SOB (SHORTNESS OF BREATH): Primary | ICD-10-CM

## 2024-06-03 PROCEDURE — 99207 PR NON-BILLABLE SERV PER CHARTING: CPT | Performed by: EMERGENCY MEDICINE

## 2024-06-03 NOTE — TELEPHONE ENCOUNTER
RN called pt and relayed to be seen in person per evisit request    RN assisted in scheduling     Flor Hayden RN

## 2024-06-03 NOTE — PATIENT INSTRUCTIONS
Dear Trev Soto,    We are sorry you are not feeling well. Based on the responses you provided, it is recommended that you be seen in-person in urgent care so we can better evaluate your symptoms. Please click here to find the nearest urgent care location to you.   You will not be charged for this Visit. Thank you for trusting us with your care.    Erasto Morales MD

## 2024-06-04 ENCOUNTER — OFFICE VISIT (OUTPATIENT)
Dept: FAMILY MEDICINE | Facility: OTHER | Age: 59
End: 2024-06-04
Payer: COMMERCIAL

## 2024-06-04 VITALS
HEIGHT: 70 IN | TEMPERATURE: 97.7 F | BODY MASS INDEX: 28.06 KG/M2 | SYSTOLIC BLOOD PRESSURE: 120 MMHG | RESPIRATION RATE: 20 BRPM | DIASTOLIC BLOOD PRESSURE: 80 MMHG | HEART RATE: 62 BPM | OXYGEN SATURATION: 96 % | WEIGHT: 196 LBS

## 2024-06-04 DIAGNOSIS — J01.90 ACUTE BACTERIAL SINUSITIS: Primary | ICD-10-CM

## 2024-06-04 DIAGNOSIS — J30.1 SEASONAL ALLERGIC RHINITIS DUE TO POLLEN: ICD-10-CM

## 2024-06-04 DIAGNOSIS — B96.89 ACUTE BACTERIAL SINUSITIS: Primary | ICD-10-CM

## 2024-06-04 PROCEDURE — 99213 OFFICE O/P EST LOW 20 MIN: CPT

## 2024-06-04 ASSESSMENT — PAIN SCALES - GENERAL: PAINLEVEL: MILD PAIN (2)

## 2024-06-04 NOTE — PROGRESS NOTES
Assessment & Plan  1. Acute bacterial sinusitis  Patient is a 59 year-old male with a history of allergic rhinitis presenting with ARBS. Symptoms have been present and progressive for greater than 10 days. History of sinus infection. No antibiotics in the past 30 days. Prescribed 7 day course of Augmentin. Discussed additional supportive cares including daily topical nasal corticosteroid, 2nd generation antihistamine, and sterile saline rinses. Patient understands and is agreeable to plan as discussed in clinic.  - amoxicillin-clavulanate (AUGMENTIN) 875-125 MG tablet; Take 1 tablet by mouth 2 times daily for 7 days  Dispense: 14 tablet; Refill: 0    2. Seasonal allergic rhinitis due to pollen  Encouraged use of daily topical nasal corticosteroid and 2nd generation antihistamine (Claritin, Zyrtec, or Allegra).     Michael Bal is a 59 year old, presenting for the following health issues:  Sinus Problem      6/4/2024     9:36 AM   Additional Questions   Roomed by Ayana FIELD         6/4/2024     9:36 AM   Patient Reported Additional Medications   Patient reports taking the following new medications None     History of Present Illness       Reason for visit:  Sinus problem  Symptom onset:  1-2 weeks ago  Symptoms include:  Thick green mucus, cough, congestion, chills at times, sinus pressure and pain, pain behind right eye, drainage out of right eye, chest congestion, breathing shallow  Symptom intensity:  Moderate  Symptom progression:  Worsening  Had these symptoms before:  Yes  Has tried/received treatment for these symptoms:  Yes  Previous treatment was successful:  Yes  Prior treatment description:  Antibiotic unsure which one  What makes it worse:  Active inflammers more  What makes it better:  Rest    He eats 2-3 servings of fruits and vegetables daily.He consumes 0 sweetened beverage(s) daily.He exercises with enough effort to increase his heart rate 20 to 29 minutes per day.  He exercises with enough  "effort to increase his heart rate 4 days per week.   He is taking medications regularly.     Presents with concerns of ongoing nasal congestion with purulent discharge (green), cough, and sinus pressure since 5/23/24. Has seasonal allergies and thought related to that, started taking daily Claritin, has not noted significant symptom relief.  Occasional cough with sputum production limited to the morning. Sinus pressure is limited to behind the eyes, right more so than left.     Denies chest pain, dyspnea, and orthopnea.       Objective    /80   Pulse 62   Temp 97.7  F (36.5  C) (Temporal)   Resp 20   Ht 1.78 m (5' 10.08\")   Wt 88.9 kg (196 lb)   SpO2 96%   BMI 28.06 kg/m    Body mass index is 28.06 kg/m .  Physical Exam  Vitals reviewed.   Constitutional:       Appearance: Normal appearance. He is not ill-appearing.   HENT:      Head: Normocephalic and atraumatic.      Right Ear: Tympanic membrane, ear canal and external ear normal.      Left Ear: Tympanic membrane, ear canal and external ear normal.      Ears:      Comments: Negative for middle ear effusion, TM injection, bulging, and erythema bilaterally.     Nose: Congestion and rhinorrhea present. Rhinorrhea is purulent.      Comments: Negative for frontal and maxillary sinus tenderness bilaterally. Pressure is localized behind bilateral eyes.      Mouth/Throat:      Mouth: Mucous membranes are moist.      Pharynx: Oropharynx is clear. No oropharyngeal exudate or posterior oropharyngeal erythema.   Eyes:      Conjunctiva/sclera: Conjunctivae normal.      Pupils: Pupils are equal, round, and reactive to light.   Cardiovascular:      Rate and Rhythm: Normal rate and regular rhythm.      Heart sounds: Normal heart sounds, S1 normal and S2 normal. No murmur heard.  Pulmonary:      Effort: Pulmonary effort is normal.      Breath sounds: Normal breath sounds.      Comments: Negative for adventitious breath sounds in all lung fields.  Skin:     General: " Skin is warm and dry.      Capillary Refill: Capillary refill takes less than 2 seconds.      Comments: No suspicious lesions or rashes.   Neurological:      Mental Status: He is alert.   Psychiatric:         Attention and Perception: Attention and perception normal.         Mood and Affect: Mood and affect normal.         Signed Electronically by: DURGA Silvestre CNP

## 2024-06-04 NOTE — PATIENT INSTRUCTIONS
Regarding your Sinus Infection:  You've been diagnosed with a sinus infection, and I'm here to help you feel better.     Below are some important steps and tips for treating your sinus infection:    Symptom Treatment:  - Rest and Hydration: Make sure to get plenty of rest to help your body fight off the infection.  Drink lots of fluids like water, herbal tea, and clear broth to stay hydrated.    - Pain Relief: Over-the-counter pain relievers such as acetaminophen (Tylenol) or ibuprofen (Advil, Motrin) can help alleviate headache or facial pain associated with sinusitis.  You can take 1000 mg of tylenol up to three times daily, as long as another provider has not restricted you from taking this type of medication.  You can take 400-600 mg of Ibuprofen up to three times daily with food, as long as another provider has restricted you from taking this type of medication.    - Warm Compresses: Apply warm compresses to your face to help ease sinus pain and pressure.  Simply soak a clean towel in warm water, wring out the excess water, and place it over your face for a few minutes at a time.    - Nasal Decongestants: Over-the-counter nasal decongestant sprays or drops may help relieve nasal congestion.  Be sure to use them only as directed and for no longer than 3 days to avoid rebound congestion.            Infection Treatment:  - Nasal Saline Irrigation: Use a saline nasal rinse, or nasal spray to help clear out mucus and soothe your nasal passages.  Follow the instructions provided with the saline rinse or spray for proper use. Make sure to use distilled water from the store, not tap water.            - Nasal Corticosteroids: I recommend using a nasal corticosteroid spray, such as Flonase or Nasacort, to reduce inflammation in your nasal passages and improve breathing and drainage.  Use the nasal spray once daily (1-2 sprays into each nostril).   It is best to do a saline rinse just prior to using the nasal  "spray.  Shake and \"prime\" the bottle first making sure a nice spray comes out prior to use.  Aim back into the sinuses, not just straight up your nose. Also aim a little away from the center (septum) of your nose.   Breath in slightly (but not excessively) with each spray. When completed breath out through the mouth  Repeat on the other side.  Wipe of the nozzle with a clean tissue when complete and cover with the manufactures cap.    Store in a room temperature area out of the light.  Don't share sprays with friends and family.    A side effect of most nasal sprays includes nose bleeds.  Be patient as it may take a few days to start working.          - Prescription Medications: After 7-10 days, we typically consider your symptoms to be from a bacterial infection rather than a virus. In your case, we have decided to treat this infection with an antibiotic. Be sure to take the antibiotics exactly as prescribed, even if you start feeling better before you finish the entire course.  All antibiotics can cause diarrhea as a side effect.   In rare instances, use of antibiotics can disrupt your normal gut bacteria that help you digest food and a new bad infection can take over called C. Diff. This leads to explosive watery diarrhea, is not pleasant, and can be difficult to treat. This is one reason we try to avoid antibiotics when able.  Additionally, these bugs can become resistant to our antibiotics over time if used frequently, which is another reason to avoid their use if we can avoid it.    The particular antibiotic I have prescribed for you is called Augmentin.       When to Seek Medical Attention: It's important to follow up with a healthcare provider if your symptoms worsen, or do not improve after a few days of treatment.  I may need to reassess your condition and adjust your treatment plan accordingly.  If you develop severe headache, facial swelling, or vision changes.  If you have a high fever lasting more than " a few days.  If you experience difficulty breathing or chest pain.  If you have persistent symptoms despite treatment.    Remember, everyone's body responds differently to treatment, so it's important to be patient and give your body time to heal. If you have any questions or concerns, don't hesitate to reach out to me.

## 2024-08-09 ENCOUNTER — TRANSFERRED RECORDS (OUTPATIENT)
Dept: HEALTH INFORMATION MANAGEMENT | Facility: CLINIC | Age: 59
End: 2024-08-09
Payer: COMMERCIAL

## 2025-03-02 ENCOUNTER — HEALTH MAINTENANCE LETTER (OUTPATIENT)
Age: 60
End: 2025-03-02

## 2025-08-17 ENCOUNTER — MYC MEDICAL ADVICE (OUTPATIENT)
Dept: FAMILY MEDICINE | Facility: OTHER | Age: 60
End: 2025-08-17
Payer: COMMERCIAL

## 2025-08-18 ENCOUNTER — PATIENT OUTREACH (OUTPATIENT)
Dept: CARE COORDINATION | Facility: CLINIC | Age: 60
End: 2025-08-18
Payer: COMMERCIAL

## 2025-08-20 ENCOUNTER — PATIENT OUTREACH (OUTPATIENT)
Dept: CARE COORDINATION | Facility: CLINIC | Age: 60
End: 2025-08-20
Payer: COMMERCIAL

## (undated) DEVICE — LUBRICATING JELLY 4.25OZ

## (undated) DEVICE — KIT ENDO TURNOVER/PROCEDURE CARRY-ON 101822

## (undated) DEVICE — GLOVE EXAM NITRILE LG

## (undated) DEVICE — SOL WATER IRRIG 1000ML BOTTLE 07139-09

## (undated) DEVICE — TUBING SUCTION 12"X1/4" N612

## (undated) RX ORDER — PROPOFOL 10 MG/ML
INJECTION, EMULSION INTRAVENOUS
Status: DISPENSED
Start: 2022-01-24

## (undated) RX ORDER — LIDOCAINE HYDROCHLORIDE 20 MG/ML
INJECTION, SOLUTION EPIDURAL; INFILTRATION; INTRACAUDAL; PERINEURAL
Status: DISPENSED
Start: 2022-01-24